# Patient Record
Sex: MALE | Race: BLACK OR AFRICAN AMERICAN | NOT HISPANIC OR LATINO
[De-identification: names, ages, dates, MRNs, and addresses within clinical notes are randomized per-mention and may not be internally consistent; named-entity substitution may affect disease eponyms.]

---

## 2018-12-16 ENCOUNTER — HOSPITAL (OUTPATIENT)
Dept: OTHER | Age: 23
End: 2018-12-16
Attending: EMERGENCY MEDICINE

## 2022-04-19 ENCOUNTER — OFFICE VISIT (OUTPATIENT)
Dept: ORTHOPEDIC SURGERY | Age: 27
End: 2022-04-19

## 2022-04-19 DIAGNOSIS — S86.111A GASTROCNEMIUS STRAIN, RIGHT, INITIAL ENCOUNTER: ICD-10-CM

## 2022-04-19 DIAGNOSIS — M79.661 RIGHT CALF PAIN: Primary | ICD-10-CM

## 2022-04-19 PROCEDURE — G8421 BMI NOT CALCULATED: HCPCS | Performed by: ORTHOPAEDIC SURGERY

## 2022-04-19 PROCEDURE — G8428 CUR MEDS NOT DOCUMENT: HCPCS | Performed by: ORTHOPAEDIC SURGERY

## 2022-04-19 PROCEDURE — 4004F PT TOBACCO SCREEN RCVD TLK: CPT | Performed by: ORTHOPAEDIC SURGERY

## 2022-04-19 PROCEDURE — 99204 OFFICE O/P NEW MOD 45 MIN: CPT | Performed by: ORTHOPAEDIC SURGERY

## 2022-04-19 PROCEDURE — L3170 FOOT PLAS HEEL STABI PRE OTS: HCPCS | Performed by: ORTHOPAEDIC SURGERY

## 2022-04-19 NOTE — PROGRESS NOTES
Date:  2022    Name:  Kayleigh Bird  Address:  [address] [address]    :  1995      Age:   27 y.o.    SSN:        Medical Record Number:  [id number]    Reason for Visit:    Chief Complaint    Leg Pain (NP Right calf pain/injury)      DOS:2022     HPI: Kayleigh Bird is a 27 y.o. male here today for evaluation of right calf pain that has been ongoing for 4 days. He is a former collegiate and overseas professional . He states he was playing recreational basketball on Friday, 4/15/2022 and injured his right calf. He states he felt a pop and has had trouble weightbearing since the injury. He complains of medial sided mid calf pain and swelling exacerbated with walking. Denies numbness and tingling. He has been using Advil and ice but has not had any formal treatment for this issue. Pain Assessment  Location of Pain: Leg  Location Modifiers: Right  Severity of Pain: 7  Quality of Pain: Sharp  Duration of Pain: Persistent  Frequency of Pain: Intermittent  Aggravating Factors: Walking  Limiting Behavior: Yes  Relieving Factors: Rest  Result of Injury: Yes  Work-Related Injury: No  Are there other pain locations you wish to document?: No  ROS: Review of systems reviewed from Patient History Form completed today and available in the patient's chart under the Media tab. No past medical history on file. No past surgical history on file. No family history on file.     Social History     Socioeconomic History    Marital status: Unknown     Spouse name: Not on file    Number of children: Not on file    Years of education: Not on file    Highest education level: Not on file   Occupational History    Not on file   Tobacco Use    Smoking status: Not on file    Smokeless tobacco: Not on file   Substance and Sexual Activity    Alcohol use: Not on file    Drug use: Not on file    Sexual activity: Not on file   Other Topics Concern    Not on file   Social History Narrative    Not on file     Social Determinants of Health     Financial Resource Strain:     Difficulty of Paying Living Expenses: Not on file   Food Insecurity:     Worried About Running Out of Food in the Last Year: Not on file    [place] of Food in the Last Year: Not on file   Transportation Needs:     Lack of Transportation (Medical): Not on file    Lack of Transportation (Non-Medical): Not on file   Physical Activity:     Days of Exercise per Week: Not on file    Minutes of Exercise per Session: Not on file   Stress:     Feeling of Stress : Not on file   Social Connections:     Frequency of Communication with Friends and Family: Not on file    Frequency of Social Gatherings with Friends and Family: Not on file    Attends Gnosticism Services: Not on file    Active Member of [address] [place] [address] [employer] or Organizations: Not on file    Attends Club or Organization Meetings: Not on file    Marital Status: Not on file   Intimate Partner Violence:     Fear of Current or Ex-Partner: Not on file    Emotionally Abused: Not on file    Physically Abused: Not on file    Sexually Abused: Not on file   Housing Stability:     Unable to Pay for Housing in the Last Year: Not on file    Number of [place] in the Last Year: Not on file    Unstable Housing in the Last Year: Not on file       No current outpatient medications on file. No current facility-administered medications for this visit. Not on File    Vital signs: There were no vitals taken for this visit. RIGHT Lower Extremity Exam:    There is normal alignment. Normal muscle tone and development. Full range of motion is present at the knee and ankle. No instability is present. Tenderness over medial gastroc. Swelling present over medial calf. The skin is warm and well-perfused. Sensation is intact to light touch. LEFT Lower Extremity Exam:    There is normal alignment. Normal muscle tone and development. Full range of motion is present at the knee and ankle. No instability is present. There is no focal tenderness. No swelling or ecchymosis is present. The skin is warm and well-perfused. Sensation is intact to light touch. Diagnostics:  Radiology:     Pertinent imaging was interpreted and reviewed with the patient. RIGHT tib/fib x-ray:    AP, lateral views were obtained  and reviewed of the right tib/fib. Impression: No acute bony abnormalities appreciated on radiographic examination. Assessment: 27year old male with medial gastrocnemius strain of right calf    Plan: Pertinent imaging was reviewed. The etiology, natural history, and treatment options for the disorder were discussed. The roles of activity medication, antiinflammatories, injections, bracing, physical therapy, and surgical interventions were all described to the patient and questions were answered. He suffered what is likely a grade 2 medial gastroc strain. Diagnosis and treatment options were discussed. He does play recreational basketball 4-6 times a week and we discussed this type of injury should take around 4-6 weeks to heal. I believe he is a candidate for formal, supervised physical therapy for a medial gastroc strain as well as a heel lift in his shoe to use for support with ambulation. He wishes to proceed. He can also continue his Advil but increase to 3 tablets, 3 times a day. The patient was advised that NSAID-type medications have several potential side effects that include: gastrointestinal irritation including hemorrhage, renal injury, as well as an increased risk for heart attack and stroke. The patient was asked to take the medication with food and to stop if there is any symptoms of GI upset, including heartburn, nausea, increased gas or diarrhea. I asked the patient to contact their medical provider for vomiting, abdominal pain or black/bloody stools.  The patient should have renal function testing per his medical provider periodically if the medication is taken on a regular basis. The patient should be alert for any swelling in the lower extremities and should stop taking the medication immediately and contact their medical provider should this occur. In addition, the patient should stop taking the medication immediately and contact their medical provider should there be any shortness of breath, fatigue and be evaluated in an emergency facility for any chest pain. The patient expresses understanding of these issues and questions were answered. I will see him back in 3-4 weeks, sooner if symptoms worsen. Chelsea Limon is in agreement with this plan. All questions were answered to patient's satisfaction and was encouraged to call with any further questions. Orders Placed This Encounter   Procedures    XR TIBIA FIBULA RIGHT (2 VIEWS)     Standing Status:   Future     Number of Occurrences:   1     Standing Expiration Date:   4/19/2023     Order Specific Question:   Reason for exam:     Answer:   pain    Ambulatory referral to Physical Therapy     Referral Priority:   Routine     Referral Type:   Eval and Treat     Referral Reason:   Specialty Services Required     Requested Specialty:   Physical Therapy     Number of Visits Requested:   1    Visco N Heel Shoe Inserts     Patient was prescribed a Visco N Heel Shoe Insert. The right calf  will require protection / support from this orthosis to improve their function. The orthosis will assist in protecting the affected area, provide functional support and facilitate healing. The patient was educated and fit by a healthcare professional with expert knowledge and specialization in brace application while under the direct supervision of the treating physician. Verbal and written instructions for the use of and application of this item were provided.    They were instructed to contact the office immediately should the brace result in increased pain, decreased sensation, increased swelling or worsening of the condition. Total time spent for evaluation, education and development of treatment plan: 48 minutes      I, Zoey Mendez ATC, am scribing for and in the presence of Dr. Mark Mcbride. 04/19/22 2:53 PM Zoey Mendez ATC    I attest that I met personally with the patient, performed the described exam, reviewed the radiographic studies and medical records associated with this patient and supervised the services that are described above.      Bhanu Mccain MD

## 2022-05-11 ENCOUNTER — OFFICE VISIT (OUTPATIENT)
Dept: ORTHOPEDIC SURGERY | Age: 27
End: 2022-05-11
Payer: COMMERCIAL

## 2022-05-11 VITALS — BODY MASS INDEX: 28.01 KG/M2 | WEIGHT: 230 LBS | HEIGHT: 76 IN

## 2022-05-11 DIAGNOSIS — S86.811D STRAIN OF CALF MUSCLE, RIGHT, SUBSEQUENT ENCOUNTER: Primary | ICD-10-CM

## 2022-05-11 PROCEDURE — 1036F TOBACCO NON-USER: CPT | Performed by: ORTHOPAEDIC SURGERY

## 2022-05-11 PROCEDURE — 99214 OFFICE O/P EST MOD 30 MIN: CPT | Performed by: ORTHOPAEDIC SURGERY

## 2022-05-11 PROCEDURE — G8419 CALC BMI OUT NRM PARAM NOF/U: HCPCS | Performed by: ORTHOPAEDIC SURGERY

## 2022-05-11 PROCEDURE — G8427 DOCREV CUR MEDS BY ELIG CLIN: HCPCS | Performed by: ORTHOPAEDIC SURGERY

## 2022-05-11 NOTE — PROGRESS NOTES
Chief Complaint  Follow-up (right calf. )      History of Present Illness:  Chandni Fitzpatrick is a pleasant 27 y.o. male here for follow-up regarding his right calf. We last saw him 1 month ago and he was diagnosed with a calf strain. 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 He is doing better today than last visit however he has some new achilles tendon tenderness. No reinjury or major setbacks. Medical History:  Patient's medications, allergies, past medical, surgical, social and family histories were reviewed and updated as appropriate. Pain Assessment  Location of Pain: Leg  Location Modifiers: Right  Quality of Pain: Aching,Sharp  Duration of Pain: A few minutes  Frequency of Pain: Intermittent  Aggravating Factors: Walking (running)  Limiting Behavior: Some  Relieving Factors: Rest  Result of Injury: Yes  Work-Related Injury: No  Are there other pain locations you wish to document?: No  ROS: Review of systems reviewed from Patient History Form completed today and available in the patient's chart under the Media tab. Pertinent items are noted in HPI  Review of systems reviewed from Patient History Form completed today and available in the patient's chart under the Media tab. Vital Signs:  Ht 6' 4\" (1.93 m)   Wt 230 lb (104.3 kg)   BMI 28.00 kg/m²     Right lower extremity examination:    Appearance: Normal alignment. No ecchymosis or induration noted. Palpation: pushpa over medial gastrocnemius muscle. No midline tenderness. Negative Homans sign. Range of motion: Full range of motion of the knee and ankle. Stability: No instability appreciated. Neurovascular: Normal sensation present. Skin warm well perfused. Additional findings: Mild gastroc weakness appreciated with toe rise.       Radiology:       Pertinent imaging was interpreted and reviewed with the patient today, images only - no report available. No new imaging was obtained during today's visit. Assessment :  26 yo male with grade 2 right calf strain    Impression:  Encounter Diagnosis   Name Primary?  Strain of calf muscle, right, subsequent encounter Yes       Office Procedures:  Orders Placed This Encounter   Procedures    Ambulatory referral to Physical Therapy     Referral Priority:   Routine     Referral Type:   Eval and Treat     Referral Reason:   Specialty Services Required     Number of Visits Requested:   1         Plan: Pertinent imaging was reviewed. The etiology, natural history, and treatment options for the disorder were discussed. The roles of activity medication, antiinflammatories, injections, bracing, physical therapy, and surgical interventions were all described to the patient and questions were answered. Patient has improved since last visit. He has not attended PT this that therefore I believe he is a candidate for formal supervised physical therapy working with a goal to return to recreational basketball 4 times a week. Follow-up in 1 month or sooner if worsening symptoms    Irma Blas is in agreement with this plan. All questions were answered to patient's satisfaction and was encouraged to call with any further questions. Total time spent for evaluation, education, and development of treatment plan: 35 minutes    Boogie Grider, [address] [facility]  5/11/2022    During this exam, IBoogie PA-C, functioned as a scribe for Dr. Awais Moore. The history taking and physical examination were performed by Dr. Awais Moore. All counseling during the appointment was performed between the patient and Dr. Awais Moore. 5/11/2022 3:20 PM    This dictation was performed with a verbal recognition program (DRAGON) and it was checked for errors. It is possible that there are still dictated errors within this office note.   If so, please bring any areas to my attention for an addendum. All efforts were made to ensure that this office note is accurate. I attest that I met personally with the patient, performed the described exam, reviewed the radiographic studies and medical records associated with this patient and supervised the services that are described above.      Herman Decker MD

## 2022-05-17 ENCOUNTER — HOSPITAL ENCOUNTER (OUTPATIENT)
Dept: PHYSICAL THERAPY | Age: 27
Setting detail: THERAPIES SERIES
Discharge: HOME | End: 2022-05-17
Payer: COMMERCIAL

## 2022-05-17 PROCEDURE — 97161 PT EVAL LOW COMPLEX 20 MIN: CPT

## 2022-05-17 PROCEDURE — 97112 NEUROMUSCULAR REEDUCATION: CPT

## 2022-05-17 PROCEDURE — 97110 THERAPEUTIC EXERCISES: CPT

## 2022-05-17 NOTE — PLAN OF CARE
The [address]  Phone [phone]  Fax [phone]      Physical Therapy Certification    Dear  Dr. Kayleigh James,    We had the pleasure of evaluating the following patient for physical therapy services at [address] [place] [address]. A summary of our findings can be found in the initial assessment below. This includes our plan of care. If you have any questions or concerns regarding these findings, please do not hesitate to contact me at the office phone number checked above. Thank you for the referral.       Physician Signature:_______________________________Date:__________________  By signing above (or electronic signature), therapists plan is approved by physician      Patient: Jyotsna Montes   : 1995   MRN: [id number]  Referring Physician:  Dr. Kayleigh James       Evaluation Date: 2022      Medical Diagnosis Information:  Diagnosis: Z86.457F (ICD-10-CM) - Strain of calf muscle, right, subsequent encounter   Treatment Diagnosis: Diagnosis: S86.811D (ICD-10-CM) - Strain of calf muscle, right, subsequent encounter                                         Insurance information: PT Insurance Information: [facility]     Precautions/ Contra-indications:       C-SSRS Triggered by Intake questionnaire (Past 2 wk assessment):   [x] No, Questionnaire did not trigger screening.   [] Yes, Patient intake triggered further evaluation      [] C-SSRS Screening completed  [] PCP notified via Plan of Care  [] Emergency services notified     Latex Allergy:  [x]NO      []YES  Preferred Language for Healthcare:   [x]English       []other:    SUBJECTIVE: Patient arrived to physical therapy with c/o achilles tendon tenderness. Pt originally experienced R calf strain on 4/15/22 while playing recreational basketball.  He reported feeling a pop and had difficulty weight bearing on R LE. Symptoms were originally located medial side of R mid calf  Pt saw MD on 4/19 and had x-ray which was negative. Pt was given heel inserts at first appointment. Pt had f/u with MD on 5/11 and calf pain improved but developed discomfort at achilles tendon. It was recommended pt attempt PT in order to assist with safe progression back to recreational basketball. Symptoms are not consistent and has some days without pain. Denies having swelling and no pain in calf    Relevant Medical History: Unremarkable for previous LE pathologies or health conditions.     Functional Disability Index: FOTO lower leg 69    Pain Scale: 4-5/10  Easing factors: Rest, ice, ibuporfen  Provocative factors: Walking     Type: []Constant   [x]Intermittent  []Radiating [x]Localized []other:     Numbness/Tingling: Denies     Occupation/School: Desk work     Living Status/Prior Level of Function: Independent with ADLs and IADLs, playing basketball 4x a week    OBJECTIVE:   ROM PROM AROM Comments    Left Right Left Right    Dorsiflexion   12 10    Plantarflexion   55 55    Inversion   31 30    Eversion   9 9                      Strength Left Right Comments   Dorsiflexion 5 5    Plantarflexion 5 5    Inversion 5 5    Eversion 5 5        Girth Left Right Comments   Transmalleolar 27 cm 27 cm         Reflexes/Sensation:    [x]Dermatomes/Myotomes intact    []Reflexes equal and normal bilaterally   []Other:    Joint mobility:    [x]Normal    []Hypo   []Hyper    Palpation: Mild muscle knot noted at R medial gastroc but no tenderness     Functional Mobility/Transfers:   SLS: Eyes open mild to moderate postural sway bilat, no Trendelenburg or pain  SL heel raise: WNL after 5 reps on L, mild hesitancy at irritation of activity that improved with rest  DL squat: WNL no pain   SL squat: 25% decrease in depth on R compared to L with knee pain on R reported     Posture: Mild calcaneal valgus, moderate forefoot pronation in stanidng    Bandages/Dressings/Incisions: N/A    Gait: Toe in L>R    Orthopedic Special Tests:   Hamstring flexibility 165 deg L, 160 deg R measured 90/90                        [x] Patient history, allergies, meds reviewed. Medical chart reviewed. See intake form. Review Of Systems (ROS):  [x]Performed Review of systems (Integumentary, CardioPulmonary, Neurological) by intake and observation. Intake form has been scanned into medical record. Patient has been instructed to contact their primary care physician regarding ROS issues if not already being addressed at this time.       Co-morbidities/Complexities (which will affect course of rehabilitation):   [x]None           Arthritic conditions   []Rheumatoid arthritis (M05.9)  []Osteoarthritis (M19.91)   Cardiovascular conditions   []Hypertension (I10)  []Hyperlipidemia (E78.5)  []Angina pectoris (I20)  []Atherosclerosis (I70)   Musculoskeletal conditions   []Disc pathology   []Congenital spine pathologies   []Prior surgical intervention  []Osteoporosis (M81.8)  []Osteopenia (M85.8)   Endocrine conditions   []Hypothyroid (E03.9)  []Hyperthyroid Gastrointestinal conditions   []Constipation (Z51.28)   Metabolic conditions   []Morbid obesity (E66.01)  []Diabetes type 1(E10.65) or 2 (E11.65)   []Neuropathy (G60.9)     Pulmonary conditions   []Asthma (J45)  []Coughing   []COPD (J44.9)   Psychological Disorders  []Anxiety (F41.9)  []Depression (F32.9)   []Other:   []Other:          Barriers to/and or personal factors that will affect rehab potential:              []Age  []Sex              [x]Motivation/Lack of Motivation                        []Co-Morbidities              []Cognitive Function, education/learning barriers              []Environmental, home barriers              []profession/work barriers  []past PT/medical experience  []other:  Justification: Desire to get back to basketball is a potential barrier to treatment    Falls Risk Assessment (30 days):   [x] Falls Risk assessed and no intervention required. [] Falls Risk assessed and Patient requires intervention due to being higher risk   TUG score (>12s at risk):     [] Falls education provided, including         ASSESSMENT:    Functional Impairments:     []Decreased LE joint mobility   []Decreased LE functional ROM   []Decreased core/proximal hip strength and neuromuscular control   [x]Decreased LE functional strength  []Reduced balance/proprioceptive control    [x]Foot biomechanics dysfunction requiring correction:   []other:    Functional Activity Limitations (from functional questionnaire and intake)   [x]Reduced ability to tolerate prolonged functional positions   []Reduced ability or difficulty with changes of positions or transfers between positions   []Reduced ability to maintain good posture and demonstrate good body mechanics with sitting, bending, and lifting   []Reduced ability to sleep   [] Reduced ability or tolerance with driving    [x]Reduced ability to squat  Participation Restrictions   []Reduced participation in self care activities   []Reduced participation in home management activities   [x]Reduced participation in work activities   [x]Reduced participation in social activities. [x]Reduced participation in sport activities. Classification :    []Signs/symptoms consistent with post-surgical status including decreased ROM, strength and function.    [x]Signs/symptoms consistent with muscle sprain/strain   []Signs/symptoms consistent with patella-femoral syndrome   []Signs/symptoms consistent with knee OA/hip OA   []Signs/symptoms consistent with internal derangement of knee/Hip/ankle   []Signs/symptoms consistent with functional hip/knee/ankle-foot weakness/NMR control      []Signs/symptoms consistent with tendinitis/tendinosis    []signs/symptoms consistent with pathology which may benefit from Dry needling      []other:      Prognosis/Rehab Potential:      [] Excellent   [x]Good    []Fair   []Poor    Tolerance of evaluation/treatment:    []Excellent   [x]Good    []Fair   []Poor    Physical Therapy Evaluation Complexity Justification  [x] A history of present problem with:  [] no personal factors and/or comorbidities that impact the plan of care;  []1-2 personal factors and/or comorbidities that impact the plan of care  []3 personal factors and/or comorbidities that impact the plan of care  [x] An examination of body systems using standardized tests and measures addressing any of the following: body structures and functions (impairments), activity limitations, and/or participation restrictions;:  [x] a total of 1-2 or more elements   [] a total of 3 or more elements   [] a total of 4 or more elements   [x] A clinical presentation with:  [x] stable and/or uncomplicated characteristics   [] evolving clinical presentation with changing characteristics  [] unstable and unpredictable characteristics;   [x] Clinical decision making of [x] low, [] moderate, [] high complexity using standardized patient assessment instrument and/or measurable assessment of functional outcome. [x] EVAL (LOW) 47872 (typically 20 minutes face-to-face)  [] EVAL (MOD) 20391 (typically 30 minutes face-to-face)  [] EVAL (HIGH) 93278 (typically 45 minutes face-to-face)  [] RE-EVAL       PLAN  Frequency/Duration:  1 days per week for 4 Weeks:  Interventions:  [x]  Therapeutic exercise including: strength training, ROM, for Lower extremity and core   [x]  NMR activation and proprioception for LE, Glutes and Core   [x]  Manual therapy as indicated for LE, Hip and spine to include: Dry Needling/IASTM, STM, PROM, Gr I-IV mobilizations, manipulation. [x] Modalities as needed that may include: thermal agents, E-stim, Biofeedback, US, iontophoresis as indicated  [x] Patient education on joint protection, postural re-education, activity modification, progression of HEP.     HEP instruction:   Access Code: [facility]  URL: [email or web address]  Date: 05/17/2022  Prepared by: Anyi Molina    Exercises  Supine Hamstring Stretch with Strap - 1-3 x daily - 7 x weekly - 1 sets - 3 reps - 30 hold  Standing Gastroc Stretch - 1-3 x daily - 7 x weekly - 1 sets - 3 reps - 30 hold  Long Sitting Eccentric Ankle Plantar Flexion with Resistance - 1-3 x daily - 7 x weekly - 3 sets - 10 reps  Arch Lifting - 1-3 x daily - 7 x weekly - 1 sets - 10 reps - 5 hold  Eccentric Heel Lowering on Step - 1-3 x daily - 7 x weekly - 3 sets - 10 reps      GOALS:   Patient stated goal: Return to playing basketball     [] Progressing: [] Met: [] Not Met: [] Adjusted    Therapist goals for Patient:   Short Term Goals: To be achieved in: 2 weeks  1. Independent in HEP and progression per patient tolerance, in order to prevent re-injury. [] Progressing: [] Met: [] Not Met: [] Adjusted   2. Patient will have a decrease in pain to facilitate improvement in movement, function, and ADLs as indicated by Functional Deficits. [] Progressing: [] Met: [] Not Met: [] Adjusted    Long Term Goals: To be achieved in: 4 weeks  1. Patient will score 87 on FOTO Lower leg assessment indicating patient ability to return to athletic abiliity. [] Progressing: [] Met: [] Not Met: [] Adjusted  2. Patient will demonstrate increased R LE hamstring flexibility that is equal to L to all for proper functional movement in weight bearing such as squatting without increase in ankle symptoms. [] Progressing: [] Met: [] Not Met: [] Adjusted  3. Patient will demonstrate an increase in eccentric strength in order to complete SL squat on R with normal depth and no compensatory movement in order to begin transition back to sport. [] Progressing: [] Met: [] Not Met: [] Adjusted  4. Patient will have increase in strength and flexibility in order to ambulate over 30 minutes with no c/o pain allowing for pt to begin return to running progression.    [] Progressing: [] Met: []

## 2022-05-17 NOTE — FLOWSHEET NOTE
The [address] [place] [address] 200, [address] [place] [address], [address] [place] [address]  Phone: [phone]   Fax:     [phone]    Physical Therapy Daily Treatment Note  Date:  2022    Patient Name:  Lulú Zarco    :  1995  MRN: [id number]  Restrictions/Precautions:    Medical/Treatment Diagnosis Information:  · Diagnosis: K24.661C (ICD-10-CM) - Strain of calf muscle, right, subsequent encounter  · Treatment Diagnosis: Diagnosis: S86.811D (ICD-10-CM) - Strain of calf muscle, right, subsequent encounter  Insurance/Certification information:  PT Insurance Information: [facility]  Physician Information:   Katharina Lara  Has the plan of care been signed (Y/N):        []  Yes  [x]  No     Date of Patient follow up with Physician:       Is this a Progress Report:     []  Yes  [x]  No        If Yes:  Date Range for reporting period:  Beginning   Ending    Progress report will be due (10 Rx or 30 days whichever is less):        Recertification will be due (POC Duration  / 90 days whichever is less):          Visit # Insurance Allowable Auth Required   1 [facility]  60 visits []  Yes [x]  No        Functional Scale: FOTO Lower leg 69    Date assessed:         Latex Allergy:  [x]NO      []YES  Preferred Language for Healthcare:   [x]English       []other:      Pain level:  4-5/10     SUBJECTIVE:  See eval    OBJECTIVE: See eval   Observation:    Test measurements:      RESTRICTIONS/PRECAUTIONS: Calf strain on R 4/15    Exercises/Interventions:     Exercise/Equipment Resistance/Repetitions Other comments   ROM/ stretching     Hamstring stretch 30\"hx3 R  supine    Calf stretch 30\"hx3 R  standing                                            Isometrics     Arch lifts 5\"hx10 R  standing   Plantarflexion     Inversion     Eversion          PRE's     Dorsiflexion     Plantarflexion Black 3x10 R  eccentric return    Inversion     Eversion     Heel walk Toe walk     SLR     Calf Raises @ step with eccentric lower 3x10 R 5/17 8\" step    Step Up     Knee Extension     Hamstring Curls     Leg Press          Balance:     Rocker Board     BOSU     SLS     Aeromat     Foam Roll     Plyoback     Tandem Stance     Biodex          Bike     Treadmill          Manual interventions              Therapeutic Exercise and NMR EXR  [x] (08430) Provided verbal/tactile cueing for activities related to strengthening, flexibility, endurance, ROM for improvements in LE, proximal hip, and core control with self care, mobility, lifting, ambulation.  [] (72427) Provided verbal/tactile cueing for activities related to improving balance, coordination, kinesthetic sense, posture, motor skill, proprioception  to assist with LE, proximal hip, and core control in self care, mobility, lifting, ambulation and eccentric single leg control.      NMR and Therapeutic Activities:    [] (97385 or 31916) Provided verbal/tactile cueing for activities related to improving balance, coordination, kinesthetic sense, posture, motor skill, proprioception and motor activation to allow for proper function of core, proximal hip and LE with self care and ADLs  [] (32801) Gait Re-education- Provided training and instruction to the patient for proper LE, core and proximal hip recruitment and positioning and eccentric body weight control with ambulation re-education including up and down stairs     Home Exercise Program:    [x] (62599) Reviewed/Progressed HEP activities related to strengthening, flexibility, endurance, ROM of core, proximal hip and LE for functional self-care, mobility, lifting and ambulation/stair navigation   [] (11891)Reviewed/Progressed HEP activities related to improving balance, coordination, kinesthetic sense, posture, motor skill, proprioception of core, proximal hip and LE for self care, mobility, lifting, and ambulation/stair navigation      Manual Treatments:  PROM / STM / Oscillations-Mobs:  G-I, II, III, IV (PA's, Inf., Post.)  [] (75915) Provided manual therapy to mobilize LE, proximal hip and/or LS spine soft tissue/joints for the purpose of modulating pain, promoting relaxation,  increasing ROM, reducing/eliminating soft tissue swelling/inflammation/restriction, improving soft tissue extensibility and allowing for proper ROM for normal function with self care, mobility, lifting and ambulation. Modalities:      Charges:  Timed Code Treatment Minutes: 24'+eval   Total Treatment Minutes: 4:15-5:05  50'     [x] EVAL (LOW) 56855 (typically 20 minutes face-to-face)  [] EVAL (MOD) 15434 (typically 30 minutes face-to-face)  [] EVAL (HIGH) 68610 (typically 45 minutes face-to-face)  [] RE-EVAL     [x] YX(05820) x  1   [] IONTO  [x] NMR (32294) x 1    [] VASO  [] Manual (75448) x      [] Other:  [] TA x      [] Mech Traction (78524)  [] ES(attended) (59759)      [] ES (un) (77916):     GOALS:   Patient stated goal: Return to playing basketball     []? Progressing: []? Met: []? Not Met: []? Adjusted     Therapist goals for Patient:   Short Term Goals: To be achieved in: 2 weeks  1. Independent in HEP and progression per patient tolerance, in order to prevent re-injury. []? Progressing: []? Met: []? Not Met: []? Adjusted   2. Patient will have a decrease in pain to facilitate improvement in movement, function, and ADLs as indicated by Functional Deficits. []? Progressing: []? Met: []? Not Met: []? Adjusted     Long Term Goals: To be achieved in: 4 weeks  1. Patient will score 87 on FOTO Lower leg assessment indicating patient ability to return to athletic abiliity.   []? Progressing: []? Met: []? Not Met: []? Adjusted  2. Patient will demonstrate increased R LE hamstring flexibility that is equal to L to all for proper functional movement in weight bearing such as squatting without increase in ankle symptoms. []? Progressing: []? Met: []? Not Met: []? Adjusted  3.  Patient will demonstrate an increase in eccentric strength in order to complete SL squat on R with normal depth and no compensatory movement in order to begin transition back to sport. []? Progressing: []? Met: []? Not Met: []? Adjusted  4. Patient will have increase in strength and flexibility in order to ambulate over 30 minutes with no c/o pain allowing for pt to begin return to running progression. []? Progressing: []? Met: []? Not Met: []? Adjusted         Overall Progression Towards Functional goals/ Treatment Progress Update:  [] Patient is progressing as expected towards functional goals listed. [] Progression is slowed due to complexities/Impairments listed. [] Progression has been slowed due to co-morbidities. [x] Plan just implemented, too soon to assess goals progression <30days   [] Goals require adjustment due to lack of progress  [] Patient is not progressing as expected and requires additional follow up with physician  [] Other    Prognosis for POC: [x] Good [] Fair  [] Poor      Patient requires continued skilled intervention: [x] Yes  [] No    Treatment/Activity Tolerance:  [x] Patient able to complete treatment  [] Patient limited by fatigue  [] Patient limited by pain     [] Patient limited by other medical complications  [] Other:     Patient Education:              5/17 Educated on proper foot mechanics, proper muscle activation and importance of eccentirc strengthening to improve functional movement and assist with return to sport    HEP instruction:   Access Code: [place]  URL: [email or web address]  Date: 05/17/2022  Prepared by: Jaime Fermin     Exercises  Supine Hamstring Stretch with Strap - 1-3 x daily - 7 x weekly - 1 sets - 3 reps - 30 hold  Standing Gastroc Stretch - 1-3 x daily - 7 x weekly - 1 sets - 3 reps - 30 hold  Long Sitting Eccentric Ankle Plantar Flexion with Resistance - 1-3 x daily - 7 x weekly - 3 sets - 10 reps  Arch Lifting - 1-3 x daily - 7 x weekly - 1 sets - 10 reps - 5 hold  Eccentric Heel Lowering on Step - 1-3 x daily - 7 x weekly - 3 sets - 10 reps        PLAN: See eval  [] Continue per plan of care [] Alter current plan (see comments above)  [x] Plan of care initiated [] Hold pending MD visit [] Discharge      Electronically signed by:  David Rowland PT    Note: If patient does not return for scheduled/ recommended follow up visits, this note will serve as a discharge from care along with most recent update on progress.

## 2022-05-24 ENCOUNTER — HOSPITAL ENCOUNTER (OUTPATIENT)
Dept: PHYSICAL THERAPY | Age: 27
Setting detail: THERAPIES SERIES
Discharge: HOME | End: 2022-05-24
Payer: COMMERCIAL

## 2022-05-24 PROCEDURE — 97140 MANUAL THERAPY 1/> REGIONS: CPT

## 2022-05-24 PROCEDURE — 97112 NEUROMUSCULAR REEDUCATION: CPT

## 2022-05-24 PROCEDURE — 97110 THERAPEUTIC EXERCISES: CPT

## 2022-05-24 NOTE — FLOWSHEET NOTE
[facility], [address] [employer] [facility] [address], [address] [place] [address]  Phone: [phone]   Fax:     [phone]    Physical Therapy Daily Treatment Note  Date:  2022    Patient Name:  Irma Blas    :  1995  MRN: [id number]  Restrictions/Precautions:    Medical/Treatment Diagnosis Information:  · Diagnosis: J67.254L (ICD-10-CM) - Strain of calf muscle, right, subsequent encounter  · Treatment Diagnosis: Diagnosis: B33.552N (ICD-10-CM) - Strain of calf muscle, right, subsequent encounter  Insurance/Certification information:  PT Insurance Information: [facility]  Physician Information:   Carlito Ferrer  Has the plan of care been signed (Y/N):        [x]  Yes  []  No     Date of Patient follow up with Physician:       Is this a Progress Report:     []  Yes  [x]  No        If Yes:  Date Range for reporting period:  Beginning   Ending    Progress report will be due (10 Rx or 30 days whichever is less):        Recertification will be due (POC Duration  / 90 days whichever is less):          Visit # Insurance Allowable Auth Required   2   [facility]  60 visits []  Yes [x]  No        Functional Scale: FOTO Lower leg 69    Date assessed:         Latex Allergy:  [x]NO      []YES  Preferred Language for Healthcare:   [x]English       []other:      Pain level:  0/10     SUBJECTIVE:   Pt states the last couple days are some of the best he has had since injury.  HEP going well     OBJECTIVE: See eval   Observation:    Test measurements:      RESTRICTIONS/PRECAUTIONS: Calf strain on R 4/15    Exercises/Interventions:     Exercise/Equipment Resistance/Repetitions Other comments   ROM/ stretching     Hamstring stretch 30\"hx3 R  supine    Calf stretch 30\"hx3 bilat ^ slant board   Biodex AROM DF/PF x3' R  Inv/evr x3' R Start  Postural stability   Pro stretch 10\"hx10 R Start     Fitter board CW/CCW x30 ea R Start  Isometrics     Arch lifts 10\"hx10 R ^5/24 standing    Plantarflexion     Inversion     Eversion          PRE's     Dorsiflexion     Plantarflexion Gray 3x10 R ^5/24 eccentric return    Inversion     Eversion     Heel walk     Toe walk     SL heel raise 3x10 R Start 5/24    Calf Raises @ step with eccentric lower 10\"hx10 R ^5/24 8\" step    SL RDL 3x10 R Start 5/24   Knee Extension     Hamstring Curls     Leg Press          Balance:     Rocker Board     BOSU     SLS     Aeromat     Foam Roll     Plyoback     Tandem Stance     Biodex          Bike     Treadmill          Manual interventions     IASTM  R medial gastroc x8' Start 5/24        Therapeutic Exercise and NMR EXR  [x] (64779) Provided verbal/tactile cueing for activities related to strengthening, flexibility, endurance, ROM for improvements in LE, proximal hip, and core control with self care, mobility, lifting, ambulation.  [] (41715) Provided verbal/tactile cueing for activities related to improving balance, coordination, kinesthetic sense, posture, motor skill, proprioception  to assist with LE, proximal hip, and core control in self care, mobility, lifting, ambulation and eccentric single leg control.      NMR and Therapeutic Activities:    [] (69571 or 91407) Provided verbal/tactile cueing for activities related to improving balance, coordination, kinesthetic sense, posture, motor skill, proprioception and motor activation to allow for proper function of core, proximal hip and LE with self care and ADLs  [] (03657) Gait Re-education- Provided training and instruction to the patient for proper LE, core and proximal hip recruitment and positioning and eccentric body weight control with ambulation re-education including up and down stairs     Home Exercise Program:    [x] (74595) Reviewed/Progressed HEP activities related to strengthening, flexibility, endurance, ROM of core, proximal hip and LE for functional self-care, mobility, lifting and ambulation/stair navigation   [] (37622)Reviewed/Progressed HEP activities related to improving balance, coordination, kinesthetic sense, posture, motor skill, proprioception of core, proximal hip and LE for self care, mobility, lifting, and ambulation/stair navigation      Manual Treatments:  PROM / STM / Oscillations-Mobs:  G-I, II, III, IV (PA's, Inf., Post.)  [x] (28380) Provided manual therapy to mobilize LE, proximal hip and/or LS spine soft tissue/joints for the purpose of modulating pain, promoting relaxation,  increasing ROM, reducing/eliminating soft tissue swelling/inflammation/restriction, improving soft tissue extensibility and allowing for proper ROM for normal function with self care, mobility, lifting and ambulation. Modalities:      Charges:  Timed Code Treatment Minutes: 45   Total Treatment Minutes: 4:16-5:03  47'     [] EVAL (LOW) 91765 (typically 20 minutes face-to-face)  [] EVAL (MOD) 98924 (typically 30 minutes face-to-face)  [] EVAL (HIGH) 62597 (typically 45 minutes face-to-face)  [] RE-EVAL     [x] TE(89825) x  1   [] IONTO  [x] NMR (28827) x 1    [] VASO  [x] Manual (47607) x 1     [] Other:  [] TA x      [] Mech Traction (77810)  [] ES(attended) (61688)      [] ES (un) (09241):     GOALS:   Patient stated goal: Return to playing basketball     []? Progressing: []? Met: []? Not Met: []? Adjusted     Therapist goals for Patient:   Short Term Goals: To be achieved in: 2 weeks  1. Independent in HEP and progression per patient tolerance, in order to prevent re-injury. []? Progressing: []? Met: []? Not Met: []? Adjusted   2. Patient will have a decrease in pain to facilitate improvement in movement, function, and ADLs as indicated by Functional Deficits. []? Progressing: []? Met: []? Not Met: []? Adjusted     Long Term Goals: To be achieved in: 4 weeks  1. Patient will score 87 on FOTO Lower leg assessment indicating patient ability to return to athletic abiliity.    []? Progressing: []? Met: []? Not Met: []? Adjusted  2. Patient will demonstrate increased R LE hamstring flexibility that is equal to L to all for proper functional movement in weight bearing such as squatting without increase in ankle symptoms. []? Progressing: []? Met: []? Not Met: []? Adjusted  3. Patient will demonstrate an increase in eccentric strength in order to complete SL squat on R with normal depth and no compensatory movement in order to begin transition back to sport. []? Progressing: []? Met: []? Not Met: []? Adjusted  4. Patient will have increase in strength and flexibility in order to ambulate over 30 minutes with no c/o pain allowing for pt to begin return to running progression. []? Progressing: []? Met: []? Not Met: []? Adjusted         Overall Progression Towards Functional goals/ Treatment Progress Update:  [] Patient is progressing as expected towards functional goals listed. [] Progression is slowed due to complexities/Impairments listed. [] Progression has been slowed due to co-morbidities. [x] Plan just implemented, too soon to assess goals progression <30days   [] Goals require adjustment due to lack of progress  [] Patient is not progressing as expected and requires additional follow up with physician  [] Other    Prognosis for POC: [x] Good [] Fair  [] Poor      Patient requires continued skilled intervention: [x] Yes  [] No    Treatment/Activity Tolerance:  [x] Patient able to complete treatment  [] Patient limited by fatigue  [] Patient limited by pain     [] Patient limited by other medical complications  [] Other: 5/24 Mild tension tenderness R medial gastroc that responded well to IASTM. Pt was challenged and fatigued with increase in intensity of treatment but no adverse effects noted or reported.      Patient Education:              5/24 Updated HEP   5/17 Educated on proper foot mechanics, proper muscle activation and importance of eccentirc strengthening to improve functional movement and assist with return to sport    HEP instruction:   Access Code: [facility]         PLAN: See eval  [x] Continue per plan of care [] Alter current plan (see comments above)  [x] Plan of care initiated [] Hold pending MD visit [] Discharge  5/24 Progress with agility drills and jumping based on pt symptoms to determine safe progression back to basketball. Electronically signed by:  Escobar Mckeon PT    Note: If patient does not return for scheduled/ recommended follow up visits, this note will serve as a discharge from care along with most recent update on progress.

## 2022-06-02 ENCOUNTER — HOSPITAL ENCOUNTER (OUTPATIENT)
Dept: PHYSICAL THERAPY | Age: 27
Setting detail: THERAPIES SERIES
Discharge: HOME | End: 2022-06-02
Payer: COMMERCIAL

## 2022-06-02 NOTE — FLOWSHEET NOTE
Physical Therapy  Cancellation/No-show Note  Patient Name:  Jyotsna Montes  :  1995   Date:  2022  Cancelled visits to date: 1  No-shows to date: 0    For today's appointment patient:  [x]  Cancelled  []  Rescheduled appointment  []  No-show     Reason given by patient:  []  Patient ill  []  Conflicting appointment  []  No transportation    []  Conflict with work  [x]  No reason given  []  Other:     Comments:      Electronically signed by:  Michaela Bradley PT, PT

## 2022-06-08 ENCOUNTER — HOSPITAL ENCOUNTER (OUTPATIENT)
Dept: PHYSICAL THERAPY | Age: 27
Setting detail: THERAPIES SERIES
Discharge: HOME | End: 2022-06-08
Payer: COMMERCIAL

## 2022-06-08 PROCEDURE — 97530 THERAPEUTIC ACTIVITIES: CPT

## 2022-06-08 PROCEDURE — 97112 NEUROMUSCULAR REEDUCATION: CPT

## 2022-06-08 PROCEDURE — 97110 THERAPEUTIC EXERCISES: CPT

## 2022-06-08 NOTE — FLOWSHEET NOTE
The [address] [place] [address],[address], [address] [address] [facility], [address] [place] [address]  Phone: [phone]   Fax:     [phone]    Physical Therapy Daily Treatment Note  Date:  2022    Patient Name:  Jay Benson    :  1995  MRN: [id number]  Restrictions/Precautions:    Medical/Treatment Diagnosis Information:  · Diagnosis: S33.728G (ICD-10-CM) - Strain of calf muscle, right, subsequent encounter  · Treatment Diagnosis: Diagnosis: P51.025C (ICD-10-CM) - Strain of calf muscle, right, subsequent encounter  Insurance/Certification information:  PT Insurance Information: [facility]  Physician Information:   Gamaliel Dumont  Has the plan of care been signed (Y/N):        [x]  Yes  []  No     Date of Patient follow up with Physician:       Is this a Progress Report:     []  Yes  [x]  No        If Yes:  Date Range for reporting period:  Beginning   Ending    Progress report will be due (10 Rx or 30 days whichever is less):        Recertification will be due (POC Duration  / 90 days whichever is less):          Visit # Insurance Allowable Auth Required   3    [facility]  60 visits []  Yes [x]  No        Functional Scale: FOTO Lower leg 69    Date assessed:         Latex Allergy:  [x]NO      []YES  Preferred Language for Healthcare:   [x]English       []other:      Pain level:  0/10     SUBJECTIVE:   Pt sates he has had muscle soreness in R calf after completing more activity such as calf raises. Has attempted to run and had soreness with increase in intensity but no pain. Has played some half melissa basketball with soreness but again nothing sharp.      OBJECTIVE: See eval   Observation:    Test measurements:      RESTRICTIONS/PRECAUTIONS: Calf strain on R 4/15    Exercises/Interventions:     Exercise/Equipment Resistance/Repetitions Other comments   ROM/ stretching     Hamstring stretch  supine    Calf stretch 30\"hx3 bilat ^ slant board   Metrilus AROM Start 5/24 Postural stability   Pro stretch Start 5/24    Fitter board Start 5/24   Dynamic warm up Heel/toe walk, knee to chest, superman, high kicks, lateral lunge, forward lunge with rot, skipping Start 6/8 1 lap ea                       Isometrics     Arch lifts ^5/24 standing    Plantarflexion     Inversion     Eversion          PRE's     Dorsiflexion     Plantarflexion ^5/24 eccentric return    Inversion     Eversion     Heel walk     Toe walk     SL heel raise 3x10 R Start 5/24 6/8 attempted to add weight but withheld d/t unilateral UE support required for balanc   Calf Raises @ step with eccentric lower 10\"hx10 R ^5/24 8\" step    SL RDL 3x10 R Start 5/24 6/8 opposite hand touching ball on floor, no UE support on table    LTD  3x10 R Start 6/8 4\" step, cues for proper  Form    Hamstring Curls     Leg Press          Balance:     Priccut Board     BOSU     SLS with hip abd and flexion SLS on R with L hip abd x30 and L hip flex x30 Start 6/8    Aeromat     Foam Roll     Plyoback     Tandem Stance     Biodex          Agility ladder Forward, lateral, ickey, out/in lat, hopscotch Start 6/8 3 laps    Jump squats  3x10 bilat Start 6/8         Manual interventions     IASTM  Withheld d/t no tenderness or tightness noted Start 5/24        Therapeutic Exercise and NMR EXR  [x] (39440) Provided verbal/tactile cueing for activities related to strengthening, flexibility, endurance, ROM for improvements in LE, proximal hip, and core control with self care, mobility, lifting, ambulation.  [] (22254) Provided verbal/tactile cueing for activities related to improving balance, coordination, kinesthetic sense, posture, motor skill, proprioception  to assist with LE, proximal hip, and core control in self care, mobility, lifting, ambulation and eccentric single leg control.      NMR and Therapeutic Activities:    [x] (52945 or 28617) Provided verbal/tactile cueing for activities related to improving balance, coordination, kinesthetic sense, posture, motor skill, proprioception and motor activation to allow for proper function of core, proximal hip and LE with self care and ADLs  [] (32603) Gait Re-education- Provided training and instruction to the patient for proper LE, core and proximal hip recruitment and positioning and eccentric body weight control with ambulation re-education including up and down stairs     Home Exercise Program:    [x] (50449) Reviewed/Progressed HEP activities related to strengthening, flexibility, endurance, ROM of core, proximal hip and LE for functional self-care, mobility, lifting and ambulation/stair navigation   [] (64659)Reviewed/Progressed HEP activities related to improving balance, coordination, kinesthetic sense, posture, motor skill, proprioception of core, proximal hip and LE for self care, mobility, lifting, and ambulation/stair navigation      Manual Treatments:  PROM / STM / Oscillations-Mobs:  G-I, II, III, IV (PA's, Inf., Post.)  [] (00634) Provided manual therapy to mobilize LE, proximal hip and/or LS spine soft tissue/joints for the purpose of modulating pain, promoting relaxation,  increasing ROM, reducing/eliminating soft tissue swelling/inflammation/restriction, improving soft tissue extensibility and allowing for proper ROM for normal function with self care, mobility, lifting and ambulation. Modalities:  CP R calf x15' 6/8     Charges:  Timed Code Treatment Minutes: 47'   Total Treatment Minutes: 10:00-11:09  69'     [] EVAL (LOW) 69202 (typically 20 minutes face-to-face)  [] EVAL (MOD) 44911 (typically 30 minutes face-to-face)  [] EVAL (HIGH) 76969 (typically 45 minutes face-to-face)  [] RE-EVAL     [x] CD(58963) x  1   [] IONTO  [x] NMR (24111) x 1    [] VASO  [] Manual (87232) x      [] Other:  [x] TA x 2     [] Mech Traction (54649)  [] ES(attended) (06244)      [] ES (un) (68117):     GOALS:   Patient stated goal: Return to playing basketball     []? Progressing: []?  Met: []? Not Met: []? Adjusted     Therapist goals for Patient:   Short Term Goals: To be achieved in: 2 weeks  1. Independent in HEP and progression per patient tolerance, in order to prevent re-injury. []? Progressing: []? Met: []? Not Met: []? Adjusted   2. Patient will have a decrease in pain to facilitate improvement in movement, function, and ADLs as indicated by Functional Deficits. []? Progressing: []? Met: []? Not Met: []? Adjusted     Long Term Goals: To be achieved in: 4 weeks  1. Patient will score 87 on FOTO Lower leg assessment indicating patient ability to return to athletic abiliity.   []? Progressing: []? Met: []? Not Met: []? Adjusted  2. Patient will demonstrate increased R LE hamstring flexibility that is equal to L to all for proper functional movement in weight bearing such as squatting without increase in ankle symptoms. []? Progressing: []? Met: []? Not Met: []? Adjusted  3. Patient will demonstrate an increase in eccentric strength in order to complete SL squat on R with normal depth and no compensatory movement in order to begin transition back to sport. []? Progressing: []? Met: []? Not Met: []? Adjusted  4. Patient will have increase in strength and flexibility in order to ambulate over 30 minutes with no c/o pain allowing for pt to begin return to running progression. []? Progressing: []? Met: []? Not Met: []? Adjusted         Overall Progression Towards Functional goals/ Treatment Progress Update:  [] Patient is progressing as expected towards functional goals listed. [] Progression is slowed due to complexities/Impairments listed. [] Progression has been slowed due to co-morbidities.   [x] Plan just implemented, too soon to assess goals progression <30days   [] Goals require adjustment due to lack of progress  [] Patient is not progressing as expected and requires additional follow up with physician  [] Other    Prognosis for POC: [x] Good [] Fair  [] Poor      Patient requires continued skilled intervention: [x] Yes  [] No    Treatment/Activity Tolerance:  [x] Patient able to complete treatment  [] Patient limited by fatigue  [] Patient limited by pain     [] Patient limited by other medical complications  [] Other: 6/8 Pt responded well to increase in intensity of treatment including agility ladders and jump squats. Cues required for proper form with jump squats in order to not land on his toes. Form improved with significant time spent providing verbal and tactile cues. Fatigued with increase in intensity of treatment but no adverse effects noted or reported. Patient Education:              6/8 Updated HEP and reviewed proper progression back to basketball including participating in only half court for 3 attempts before transitioning to full court. Educated on difference between pain and muscle soreness/fatigue. 5/24 Updated HEP   5/17 Educated on proper foot mechanics, proper muscle activation and importance of eccentirc strengthening to improve functional movement and assist with return to sport    HEP instruction:   Access Code: [facility]         PLAN: See eval  [x] Continue per plan of care [] Alter current plan (see comments above)  [x] Plan of care initiated [] Hold pending MD visit [] Discharge  6/8 Pt to continue with HEP and progress back to basketball in half court setting. Pt will f/u next week and complete progress note for potential discharge. Electronically signed by:  Nicole Espitia PT    Note: If patient does not return for scheduled/ recommended follow up visits, this note will serve as a discharge from care along with most recent update on progress.

## 2022-06-17 ENCOUNTER — HOSPITAL ENCOUNTER (OUTPATIENT)
Dept: PHYSICAL THERAPY | Age: 27
Setting detail: THERAPIES SERIES
Discharge: HOME | End: 2022-06-17
Payer: COMMERCIAL

## 2022-06-17 PROCEDURE — 97530 THERAPEUTIC ACTIVITIES: CPT

## 2022-06-17 PROCEDURE — 97110 THERAPEUTIC EXERCISES: CPT

## 2022-06-17 NOTE — DISCHARGE SUMMARY
The [facility], [address] [place] [address] Drive [address], [address]  Phone: [phone]   Fax:     [phone]   Physical Therapy Discharge Summary    Dear  Dr. Danniel Claude,    We had the pleasure of treating the following patient for physical therapy services at [address] [place] [address]. A summary of our findings can be found in the discharge summary below. If you have any questions or concerns regarding these findings, please do not hesitate to contact me at the office phone number checked above. Thank you for the referral.     Physician Signature:________________________________Date:__________________  By signing above (or electronic signature), therapists plan is approved by physician      Overall Response to Treatment:   [x]Patient is responding well to treatment and improvement is noted with regards to increase in flexibility, strength and functional strength allowing pt to return to basketball without pain. Based on progress he is appropriate for discharge.     []Patient should continue to improve in reasonable time if they continue HEP   []Patient has plateaued and is no longer responding to skilled PT intervention    []Patient is getting worse and would benefit from return to referring MD   []Patient unable to adhere to initial POC   []Other:     Physical Therapy Daily Treatment Note  Date:  2022    Patient Name:  Angella Keenan    :  1995  MRN: [id number]  Restrictions/Precautions:    Medical/Treatment Diagnosis Information:  · Diagnosis: H06.806E (ICD-10-CM) - Strain of calf muscle, right, subsequent encounter  · Treatment Diagnosis: Diagnosis: S86.811D (ICD-10-CM) - Strain of calf muscle, right, subsequent encounter  Insurance/Certification information:  PT Insurance Information: [facility]  Physician Information:   Kian Diaz  Has the plan of care been signed (Y/N):        [x]  Yes  []  No     Date of Patient follow up with Physician:       Is this a Progress Report:     [x]  Yes  [x]  No        If Yes:  Date Range for reporting period:  Beginning 5/17  Ending 6/17    Progress report will be due (10 Rx or 30 days whichever is less):       Recertification will be due (POC Duration  / 90 days whichever is less):         Visit # Insurance Allowable Auth Required   4  6/17  [facility]  60 visits []  Yes [x]  No        Functional Scale:   FOTO lower leg 85    Date assessed: 6/17  FOTO Lower leg 69    Date assessed:  5/17       Latex Allergy:  [x]NO      []YES  Preferred Language for Healthcare:   [x]English       []other:      Pain level:  0/10 6/17    SUBJECTIVE:  6/17 Pt states he is doing well with no pain with ADL's. Played in 4 basketball games today for 45 minutes with mild muscle soreness but no c/o pain. OBJECTIVE: Updated below on 6/17    Observation:  Functional Mobility/Transfers:    SLS: Eyes open WNL bilat   SL heel raise: 5 reps ea bilat WNL and no pain    DL squat: WNL no pain             SL squat: Depth equal bilaterally with no pain, mild forward knee flexion bilaterally.    Gait: Independent, WNL    Test measurements:     Palpation: No tenderness  ROM PROM AROM Comments     Left Right Left Right     Dorsiflexion     12 13     Plantarflexion     55 55     Inversion     31 32     Eversion     9 14     Orthopedic Special Tests:   Hamstring flexibility 165 deg L, 165 deg R measured 90/90       RESTRICTIONS/PRECAUTIONS: Calf strain on R 4/15    Exercises/Interventions:     Exercise/Equipment Resistance/Repetitions Other comments   ROM/ stretching     Hamstring stretch 5/17 supine    Calf stretch 30\"hx3 bilat ^5/24 slant board   Biodex AROM Start 5/24 Postural stability   Pro stretch Start 5/24    Fitter board Start 5/24   Dynamic warm up Start 6/8 1 lap ea                       Isometrics     Arch lifts ^5/24 standing    Plantarflexion     Inversion     Eversion          PRE's     Dorsiflexion     Plantarflexion ^5/24 eccentric return    Inversion     Eversion     Heel walk     Toe walk     SL heel raise 3x10 R Start 5/24 6/8 attempted to add weight but withheld d/t unilateral UE support required for balanc   Calf Raises @ step with eccentric lower 10\"hx10 R ^5/24 8\" step    SL RDL 3x10 R ^6/17 blue TB    LTD  6/17 progressed with SL Y combination    SL Y combination reach 1x10 ea on R 6/17   SL running balance 3x10 R 6/17         Balance:     Rocker Board     BOSU     SLS with hip abd and flexion Start 6/8    Aeromat    Foam Roll    Plyoback    Tandem Stance    Biodex        Agility ladder Start 6/8 3 laps    Jump squats  Start 6/8         Manual interventions     IASTM   Start 5/24        Therapeutic Exercise and NMR EXR  [x] (24300) Provided verbal/tactile cueing for activities related to strengthening, flexibility, endurance, ROM for improvements in LE, proximal hip, and core control with self care, mobility, lifting, ambulation.  [] (82309) Provided verbal/tactile cueing for activities related to improving balance, coordination, kinesthetic sense, posture, motor skill, proprioception  to assist with LE, proximal hip, and core control in self care, mobility, lifting, ambulation and eccentric single leg control.      NMR and Therapeutic Activities:    [x] (38550 or 18815) Provided verbal/tactile cueing for activities related to improving balance, coordination, kinesthetic sense, posture, motor skill, proprioception and motor activation to allow for proper function of core, proximal hip and LE with self care and ADLs  [] (56665) Gait Re-education- Provided training and instruction to the patient for proper LE, core and proximal hip recruitment and positioning and eccentric body weight control with ambulation re-education including up and down stairs     Home Exercise Program:    [x] (89907) Reviewed/Progressed HEP activities related to strengthening, flexibility, endurance, ROM of core, proximal hip and LE for functional self-care, mobility, lifting and ambulation/stair navigation   [] (30981)Reviewed/Progressed HEP activities related to improving balance, coordination, kinesthetic sense, posture, motor skill, proprioception of core, proximal hip and LE for self care, mobility, lifting, and ambulation/stair navigation      Manual Treatments:  PROM / STM / Oscillations-Mobs:  G-I, II, III, IV (PA's, Inf., Post.)  [] (35379) Provided manual therapy to mobilize LE, proximal hip and/or LS spine soft tissue/joints for the purpose of modulating pain, promoting relaxation,  increasing ROM, reducing/eliminating soft tissue swelling/inflammation/restriction, improving soft tissue extensibility and allowing for proper ROM for normal function with self care, mobility, lifting and ambulation. Modalities:      Charges:  Timed Code Treatment Minutes: 30'   Total Treatment Minutes: 4:05-4:40  28'     [] EVAL (LOW) 455 1011 (typically 20 minutes face-to-face)  [] EVAL (MOD) 88037 (typically 30 minutes face-to-face)  [] EVAL (HIGH) 03132 (typically 45 minutes face-to-face)  [] RE-EVAL     [x] IY(40583) x  1   [] IONTO  [] NMR (04805) x     [] VASO  [] Manual (03083) x      [] Other:  [x] TA x 1     [] Mech Traction (65285)  [] ES(attended) (09306)      [] ES (un) (97173):     GOALS:   Patient stated goal: Return to playing basketball     []? Progressing: [x]? Met: []? Not Met: []? Adjusted     Therapist goals for Patient:   Short Term Goals: To be achieved in: 2 weeks  1. Independent in HEP and progression per patient tolerance, in order to prevent re-injury. []? Progressing: [x]? Met: []? Not Met: []? Adjusted   2. Patient will have a decrease in pain to facilitate improvement in movement, function, and ADLs as indicated by Functional Deficits. []? Progressing: [x]? Met: []? Not Met: []? Adjusted     Long Term Goals: To be achieved in: 4 weeks  1.  Patient will score 87 on FOTO Lower leg assessment indicating patient ability to return to athletic abiliity. [x]? Progressing: []? Met: []? Not Met: []? Adjusted  2. Patient will demonstrate increased R LE hamstring flexibility that is equal to L to all for proper functional movement in weight bearing such as squatting without increase in ankle symptoms. []? Progressing: [x]? Met: []? Not Met: []? Adjusted  3. Patient will demonstrate an increase in eccentric strength in order to complete SL squat on R with normal depth and no compensatory movement in order to begin transition back to sport. []? Progressing: [x]? Met: []? Not Met: []? Adjusted  4. Patient will have increase in strength and flexibility in order to ambulate over 30 minutes with no c/o pain allowing for pt to begin return to running progression. []? Progressing: [x]? Met: []? Not Met: []? Adjusted         Overall Progression Towards Functional goals/ Treatment Progress Update:  [x] Patient is progressing as expected towards functional goals listed. [] Progression is slowed due to complexities/Impairments listed. [] Progression has been slowed due to co-morbidities. [] Plan just implemented, too soon to assess goals progression <30days   [] Goals require adjustment due to lack of progress  [] Patient is not progressing as expected and requires additional follow up with physician  [] Other    Prognosis for POC: [x] Good [] Fair  [] Poor      Patient requires continued skilled intervention: [x] Yes  [] No    Treatment/Activity Tolerance:  [x] Patient able to complete treatment  [] Patient limited by fatigue  [] Patient limited by pain     [] Patient limited by other medical complications  [] Other: 6/17 Pt is progressing well overall and is competent with HEP. Based on progress including pt returning to basketball as well as competence with HEP he is appropriate for discharge. Patient Education:              6/17 Updated HEP and reviewed proper progression with regards to time playing basketball.    6/8 Updated HEP and reviewed proper progression back to basketball including participating in only half court for 3 attempts before transitioning to full court. Educated on difference between pain and muscle soreness/fatigue. 5/24 Updated HEP   5/17 Educated on proper foot mechanics, proper muscle activation and importance of eccentirc strengthening to improve functional movement and assist with return to sport    HEP instruction:   Access Code: [facility]         PLAN: See eval  [] Continue per plan of care [] Jeff Fraga current plan (see comments above)  [] Plan of care initiated [] Hold pending MD visit [x] Discharge  6/17 Discharge from PT to continue with HEP independently as pt continues to progress time playing basketball. Electronically signed by:  Isadora Olivas PT    Note: If patient does not return for scheduled/ recommended follow up visits, this note will serve as a discharge from care along with most recent update on progress.

## 2022-08-22 ENCOUNTER — TELEPHONE (OUTPATIENT)
Dept: ORTHOPEDIC SURGERY | Age: 27
End: 2022-08-22

## 2022-08-22 ENCOUNTER — OFFICE VISIT (OUTPATIENT)
Dept: ORTHOPEDIC SURGERY | Age: 27
End: 2022-08-22
Payer: COMMERCIAL

## 2022-08-22 DIAGNOSIS — M25.562 LEFT KNEE PAIN, UNSPECIFIED CHRONICITY: Primary | ICD-10-CM

## 2022-08-22 PROCEDURE — 1036F TOBACCO NON-USER: CPT | Performed by: ORTHOPAEDIC SURGERY

## 2022-08-22 PROCEDURE — 99204 OFFICE O/P NEW MOD 45 MIN: CPT | Performed by: ORTHOPAEDIC SURGERY

## 2022-08-22 PROCEDURE — G8428 CUR MEDS NOT DOCUMENT: HCPCS | Performed by: ORTHOPAEDIC SURGERY

## 2022-08-22 PROCEDURE — G8419 CALC BMI OUT NRM PARAM NOF/U: HCPCS | Performed by: ORTHOPAEDIC SURGERY

## 2022-08-22 NOTE — PROGRESS NOTES
Date:  2022    Name:  Louisa Tse  Address:  [address] [address]    :  1995      Age:   27 y.o.    SSN:  [id number]      Medical Record Number:  [id number]    Reason for Visit:    Chief Complaint    Knee Pain (Old patient / new problem left knee )      DOS:2022     HPI: Louisa Tse is a 27 y.o. male here today for pain. Patient states he has had pain for 2 to 3 weeks during activities such as basketball. The pain is located in the anterior lateral aspect of the left knee, he has popping and clicking and catching. It sometimes will be painful and give way. He has no distinct injury. Been resting and has been improving. ROS: Review of systems reviewed from Patient History Form completed today and available in the patient's chart under the Media tab. No past medical history on file. No past surgical history on file. No family history on file. Social History     Socioeconomic History    Marital status: Unknown   Tobacco Use    Smoking status: Never    Smokeless tobacco: Never   Substance and Sexual Activity    Drug use: Never       No current outpatient medications on file. No current facility-administered medications for this visit. No Known Allergies    Vital signs: There were no vitals taken for this visit. Left knee exam    Gait: No use of assistive devices. No antalgic gait. Alignment: normal alignment. Inspection/skin: Skin is intact without erythema or ecchymosis. No gross deformity. Palpation: mild crepitus over the anterior lateral aspect of the knee. no joint line tenderness present. Range of Motion: There is full range of motion. Strength: Normal quadriceps development. Effusion: No effusion or swelling present. Ligamentous stability: No cruciate or collateral ligament instability. Neurologic and vascular: Skin is warm and well-perfused. Sensation is intact to light-touch.      Special tests: Negative concern for anterior horn lateral meniscus tear versus ganglion versus plica versus loose body. Plan: Pertinent imaging was reviewed. The etiology, natural history, and treatment options for the disorder were discussed. The roles of activity medication, antiinflammatories, injections, bracing, physical therapy, and surgical interventions were all described to the patient and questions were answered. Patient presents with insidious onset of left anterior lateral knee pain and popping. He has no distinct injury. Examination is significant for popping just lateral to the patellar tendon on the anterior knee. X-rays show normal left knee. Given the location as well as mechanical symptoms he is having over his anterior lateral left knee we would like to rule out a anterior horn lateral meniscus tear. This also could represent small loose body, ganglion cyst or plica. We will obtain an MRI to further evaluate the knee and see him back once this is done. Mark Graves is in agreement with this plan. All questions were answered to patient's satisfaction and was encouraged to call with any further questions. Total time spent for evaluation, education, and development of treatment plan: 55 minutes    Malik De La Torre MD    This dictation was performed with a verbal recognition program [facility]) and it was checked for errors. It is possible that there are still dictated errors within this office note. If so, please bring any areas to my attention for an addendum. All efforts were made to ensure that this office note is accurate. I attest that I met personally with the patient, performed the described exam, reviewed the radiographic studies and medical records associated with this patient and supervised the services that are described above.      Layla Preciado MD

## 2022-08-30 ENCOUNTER — OFFICE VISIT (OUTPATIENT)
Dept: ORTHOPEDIC SURGERY | Age: 27
End: 2022-08-30
Payer: COMMERCIAL

## 2022-08-30 VITALS — WEIGHT: 235 LBS | BODY MASS INDEX: 29.22 KG/M2 | HEIGHT: 75 IN

## 2022-08-30 DIAGNOSIS — S83.282A TEAR OF LATERAL MENISCUS OF LEFT KNEE, UNSPECIFIED TEAR TYPE, UNSPECIFIED WHETHER OLD OR CURRENT TEAR, INITIAL ENCOUNTER: Primary | ICD-10-CM

## 2022-08-30 PROCEDURE — G8419 CALC BMI OUT NRM PARAM NOF/U: HCPCS | Performed by: ORTHOPAEDIC SURGERY

## 2022-08-30 PROCEDURE — 1036F TOBACCO NON-USER: CPT | Performed by: ORTHOPAEDIC SURGERY

## 2022-08-30 PROCEDURE — G8427 DOCREV CUR MEDS BY ELIG CLIN: HCPCS | Performed by: ORTHOPAEDIC SURGERY

## 2022-08-30 PROCEDURE — 99214 OFFICE O/P EST MOD 30 MIN: CPT | Performed by: ORTHOPAEDIC SURGERY

## 2022-08-30 NOTE — PROGRESS NOTES
Chief Complaint  Follow-up (Mri left knee )      History of Present Illness:  Saúl Alaniz is a pleasant 27 y.o. male who presents today for follow up evaluation of left knee pain. He is here to review MRI results. Patient states he had pain for 3-4 weeks during activities such as orange theory and basketball. The pain is located in the anterior lateral aspect of the left knee, he has popping and clicking and catching. It sometimes will be painful and give way. He has no distinct injury. He has been resting and has been improving, in no pain today. Medical History:  Patient's medications, allergies, past medical, surgical, social and family histories were reviewed and updated as appropriate. Pertinent items are noted in HPI  Review of systems reviewed from Patient History Form completed today and available in the patient's chart under the Media tab. Pain Assessment  Location of Pain: Knee  Location Modifiers: Left  Severity of Pain: 2  Quality of Pain: Aching  Frequency of Pain: Constant  Limiting Behavior: Some  Relieving Factors: Rest, Ice  Result of Injury: Yes  Work-Related Injury: No  Are there other pain locations you wish to document?: No    History reviewed. No pertinent past medical history. History reviewed. No pertinent surgical history. History reviewed. No pertinent family history. Social History     Socioeconomic History    Marital status: Single     Spouse name: None    Number of children: 0    Years of education: None    Highest education level: None   Tobacco Use    Smoking status: Never    Smokeless tobacco: Never   Substance and Sexual Activity    Drug use: Never       No current outpatient medications on file. No current facility-administered medications for this visit. No Known Allergies    Vital signs:  Ht 6' 3\" (1.905 m)   Wt 235 lb (106.6 kg)   BMI 29.37 kg/m²              LEFT Knee Exam:    Gait: No use of assistive devices.  No antalgic etiology, natural history, and treatment options for the disorder were discussed. The roles of activity medication, antiinflammatories, injections, bracing, physical therapy, and surgical interventions were all described to the patient and questions were answered. MRI does show a lateral meniscus tear but patient is asymptomatic today. He has no tenderness, has normal range of motion, no swelling, and normal function. If symptoms were to return, we can consider surgical intervention. I will see him back if symptoms return. Bisi Prajapati is in agreement with this plan. All questions were answered to patient's satisfaction and was encouraged to call with any further questions. Total time spent for evaluation, education and development of treatment plan: 35 minutes        Estelita CUBA ATC, am scribing for and in the presence of Dr. Thong Romo. 08/30/22 3:37 PM Estelita Cline ATC      I attest that I met personally with the patient, performed the described exam, reviewed the radiographic studies and medical records associated with this patient and supervised the services that are described above.      Elvia St MD

## 2022-10-12 ENCOUNTER — TELEPHONE (OUTPATIENT)
Dept: ORTHOPEDIC SURGERY | Age: 27
End: 2022-10-12

## 2022-10-14 ENCOUNTER — OFFICE VISIT (OUTPATIENT)
Dept: ORTHOPEDIC SURGERY | Age: 27
End: 2022-10-14
Payer: COMMERCIAL

## 2022-10-14 VITALS — BODY MASS INDEX: 27.98 KG/M2 | HEIGHT: 75 IN | WEIGHT: 225 LBS

## 2022-10-14 DIAGNOSIS — S83.282A TEAR OF LATERAL MENISCUS OF LEFT KNEE, UNSPECIFIED TEAR TYPE, UNSPECIFIED WHETHER OLD OR CURRENT TEAR, INITIAL ENCOUNTER: Primary | ICD-10-CM

## 2022-10-14 PROCEDURE — 1036F TOBACCO NON-USER: CPT | Performed by: ORTHOPAEDIC SURGERY

## 2022-10-14 PROCEDURE — G8484 FLU IMMUNIZE NO ADMIN: HCPCS | Performed by: ORTHOPAEDIC SURGERY

## 2022-10-14 PROCEDURE — 99214 OFFICE O/P EST MOD 30 MIN: CPT | Performed by: ORTHOPAEDIC SURGERY

## 2022-10-14 PROCEDURE — G8419 CALC BMI OUT NRM PARAM NOF/U: HCPCS | Performed by: ORTHOPAEDIC SURGERY

## 2022-10-14 PROCEDURE — G8427 DOCREV CUR MEDS BY ELIG CLIN: HCPCS | Performed by: ORTHOPAEDIC SURGERY

## 2022-10-14 NOTE — PROGRESS NOTES
Chief Complaint  Follow-up (Left knee. )      History of Present Illness:  Stephany Hunter is a pleasant 32 y.o. male here for follow up regarding his left knee. As a review, and conservatively for a anterior horn of the lateral meniscus tear. He has been working with physical therapy, and had been doing quite well however patient noticed that with his increased activity with basketball he has had worsening pain. He also notices some swelling that is focal to the anterior lateral part of his knee. Patient is quite active, he plays basketball participates in [facility] and would like to continue doing this. He is here today to talk about long-term options. Medical History:  Patient's medications, allergies, past medical, surgical, social and family histories were reviewed and updated as appropriate. Pain Assessment  Location of Pain: Knee  Location Modifiers: Left  Severity of Pain: 1  Quality of Pain: Dull  Duration of Pain: A few minutes  Frequency of Pain: Intermittent  Limiting Behavior: Some  Relieving Factors: Rest, Ice  Result of Injury: Yes  Work-Related Injury: No  Are there other pain locations you wish to document?: No  ROS: Review of systems reviewed from Patient History Form completed today and available in the patient's chart under the Media tab. Pertinent items are noted in HPI  Review of systems reviewed from Patient History Form completed today and available in the patient's chart under the Media tab. Vital Signs:  Ht 6' 3\" (1.905 m)   Wt 225 lb (102.1 kg)   BMI 28.12 kg/m²     Lateral knee exam    Gait: No use of assistive devices. No antalgic gait. Alignment: normal alignment. Inspection/skin: Skin is intact without erythema or ecchymosis. No gross deformity. Palpation: Marked anterior lateral joint line tenderness. no lateral joint line tenderness present. no crepitus. no pain with compression of the patella. No bursal swelling is present.      Range of Motion: There is full range of motion. Strength: Normal quadriceps development. Effusion: Small effusion. No swelling present. Ligamentous stability: No cruciate or collateral ligament instability. Anterior and posterior drawer signs are negative. Lachman sign is negative. Neurologic and vascular: Skin is warm and well-perfused. There is no pretibial edema. Pulses are symmetric. Sensation is intact to light-touch    Special tests:  Positive hyperflexion test. Positive Jamie sign. The patella apprehension sign is negative. Radiology:       Pertinent imaging was interpreted and reviewed with the patient today, both images and report. MRI of the left knee dated 8/26/2022 was interpreted and reviewed with the patient today. CONCLUSION:   1. Torn anterior horn root lateral meniscus. 2. Small cartilage ulcer posterolateral femoral condyle. Mild marrow reaction. 3. Small effusion. Assessment :  27year old male with lateral meniscus tear of left knee, asymptomatic    Impression:  Encounter Diagnosis   Name Primary? Tear of lateral meniscus of left knee, unspecified tear type, unspecified whether old or current tear, initial encounter Yes       Office Procedures:  No orders of the defined types were placed in this encounter. Plan: Pertinent imaging was reviewed. The etiology, natural history, and treatment options for the disorder were discussed. The roles of activity medication, antiinflammatories, injections, bracing, physical therapy, and surgical interventions were all described to the patient and questions were answered. Patient has a anterior horn lateral meniscus tear. This is causing swelling and discomfort with basketball. He has tried PT, antiinflammatories, activity modification for 4 months with no improvement. At this time he is a candidate for a left knee arthroscopy with lateral meniscus repair vs excision. He would like to proceed with this. Marita Moctezuma is in agreement with this plan. All questions were answered to patient's satisfaction and was encouraged to call with any further questions. Total time spent for evaluation, education, and development of treatment plan: 39 minutes    Lavonne Oconnell, [facility]  10/14/2022    During this exam, I, Lavonne Oconnell PA-C, functioned as a scribe for Dr. Erick Wray. The history taking and physical examination were performed by Dr. Erick Wray. All counseling during the appointment was performed between the patient and Dr. Erick Wray. 10/14/2022 9:31 AM    This dictation was performed with a verbal recognition program (DRAGON) and it was checked for errors. It is possible that there are still dictated errors within this office note. If so, please bring any areas to my attention for an addendum. All efforts were made to ensure that this office note is accurate. I attest that I met personally with the patient, performed the described exam, reviewed the radiographic studies and medical records associated with this patient and supervised the services that are described above.      Dangelo Johnson MD

## 2023-01-13 ENCOUNTER — TELEPHONE (OUTPATIENT)
Dept: ORTHOPEDIC SURGERY | Age: 28
End: 2023-01-13

## 2023-01-13 NOTE — TELEPHONE ENCOUNTER
Surgery and/or Procedure Scheduling     Contact Name: Kimberly Walker"    Surgical/Procedure Request: Pascale Hansondavid Knee/Meniscus    Patient Contact Number: [phone]     Patient calling for Sx scheduling. *Please leave a message if unanswered* at above tele#.

## 2023-02-06 SDOH — HEALTH STABILITY: PHYSICAL HEALTH: ON AVERAGE, HOW MANY MINUTES DO YOU ENGAGE IN EXERCISE AT THIS LEVEL?: 70 MIN

## 2023-02-06 SDOH — HEALTH STABILITY: PHYSICAL HEALTH: ON AVERAGE, HOW MANY DAYS PER WEEK DO YOU ENGAGE IN MODERATE TO STRENUOUS EXERCISE (LIKE A BRISK WALK)?: 4 DAYS

## 2023-02-06 ASSESSMENT — SOCIAL DETERMINANTS OF HEALTH (SDOH)
WITHIN THE LAST YEAR, HAVE YOU BEEN HUMILIATED OR EMOTIONALLY ABUSED IN OTHER WAYS BY YOUR PARTNER OR EX-PARTNER?: NO
WITHIN THE LAST YEAR, HAVE YOU BEEN AFRAID OF YOUR PARTNER OR EX-PARTNER?: NO
WITHIN THE LAST YEAR, HAVE TO BEEN RAPED OR FORCED TO HAVE ANY KIND OF SEXUAL ACTIVITY BY YOUR PARTNER OR EX-PARTNER?: NO
WITHIN THE LAST YEAR, HAVE YOU BEEN KICKED, HIT, SLAPPED, OR OTHERWISE PHYSICALLY HURT BY YOUR PARTNER OR EX-PARTNER?: NO

## 2023-02-07 ENCOUNTER — OFFICE VISIT (OUTPATIENT)
Dept: FAMILY MEDICINE CLINIC | Age: 28
End: 2023-02-07
Payer: COMMERCIAL

## 2023-02-07 VITALS
DIASTOLIC BLOOD PRESSURE: 62 MMHG | WEIGHT: 233 LBS | HEIGHT: 75 IN | OXYGEN SATURATION: 98 % | SYSTOLIC BLOOD PRESSURE: 128 MMHG | HEART RATE: 75 BPM | BODY MASS INDEX: 28.97 KG/M2

## 2023-02-07 DIAGNOSIS — Z83.3 FAMILY HISTORY OF DIABETES MELLITUS: ICD-10-CM

## 2023-02-07 DIAGNOSIS — S83.282D TEAR OF LATERAL MENISCUS OF LEFT KNEE, UNSPECIFIED TEAR TYPE, UNSPECIFIED WHETHER OLD OR CURRENT TEAR, SUBSEQUENT ENCOUNTER: ICD-10-CM

## 2023-02-07 DIAGNOSIS — Z01.818 PREOP GENERAL PHYSICAL EXAM: Primary | ICD-10-CM

## 2023-02-07 DIAGNOSIS — Z13.220 SCREENING FOR LIPID DISORDERS: ICD-10-CM

## 2023-02-07 DIAGNOSIS — Z13.0 SCREENING FOR IRON DEFICIENCY ANEMIA: ICD-10-CM

## 2023-02-07 PROCEDURE — 99213 OFFICE O/P EST LOW 20 MIN: CPT | Performed by: NURSE PRACTITIONER

## 2023-02-07 PROCEDURE — G8484 FLU IMMUNIZE NO ADMIN: HCPCS | Performed by: NURSE PRACTITIONER

## 2023-02-07 PROCEDURE — G8427 DOCREV CUR MEDS BY ELIG CLIN: HCPCS | Performed by: NURSE PRACTITIONER

## 2023-02-07 PROCEDURE — G8419 CALC BMI OUT NRM PARAM NOF/U: HCPCS | Performed by: NURSE PRACTITIONER

## 2023-02-07 SDOH — ECONOMIC STABILITY: FOOD INSECURITY: WITHIN THE PAST 12 MONTHS, YOU WORRIED THAT YOUR FOOD WOULD RUN OUT BEFORE YOU GOT MONEY TO BUY MORE.: NEVER TRUE

## 2023-02-07 SDOH — ECONOMIC STABILITY: FOOD INSECURITY: WITHIN THE PAST 12 MONTHS, THE FOOD YOU BOUGHT JUST DIDN'T LAST AND YOU DIDN'T HAVE MONEY TO GET MORE.: NEVER TRUE

## 2023-02-07 SDOH — ECONOMIC STABILITY: HOUSING INSECURITY
IN THE LAST 12 MONTHS, WAS THERE A TIME WHEN YOU DID NOT HAVE A STEADY PLACE TO SLEEP OR SLEPT IN A SHELTER (INCLUDING NOW)?: NO

## 2023-02-07 SDOH — ECONOMIC STABILITY: INCOME INSECURITY: HOW HARD IS IT FOR YOU TO PAY FOR THE VERY BASICS LIKE FOOD, HOUSING, MEDICAL CARE, AND HEATING?: NOT HARD AT ALL

## 2023-02-07 ASSESSMENT — PATIENT HEALTH QUESTIONNAIRE - PHQ9
SUM OF ALL RESPONSES TO PHQ QUESTIONS 1-9: 0
SUM OF ALL RESPONSES TO PHQ QUESTIONS 1-9: 0
2. FEELING DOWN, DEPRESSED OR HOPELESS: 0
1. LITTLE INTEREST OR PLEASURE IN DOING THINGS: 0
SUM OF ALL RESPONSES TO PHQ9 QUESTIONS 1 & 2: 0
SUM OF ALL RESPONSES TO PHQ QUESTIONS 1-9: 0
SUM OF ALL RESPONSES TO PHQ QUESTIONS 1-9: 0

## 2023-02-07 ASSESSMENT — ENCOUNTER SYMPTOMS
VOMITING: 0
SHORTNESS OF BREATH: 0
NAUSEA: 0
ABDOMINAL PAIN: 0
RHINORRHEA: 0
DIARRHEA: 0

## 2023-02-07 NOTE — PROGRESS NOTES
Preoperative Consultation    Jacki Petit  YOB: 1995    This patient presents to the office today for a preoperative consultation at the request of surgeon, Letitia El, who plans on performing meniscus repair. Left meniscus tear  Started in August- was traveling on a trip  Was gradual  Was working out and playing sports- no injuries; now is not able to go the gym consistently  Has popping  No weakness and numbness- can get stiff and locks up (mostly on recovery days)  Did x-ray and MRI  Did not recommend PT    No personal or FH of problems with anesthesia. No personal problems with bleeding or clotting. Uncle with hx of DVT. There is no problem list on file for this patient. No past surgical history on file. No Known Allergies  No outpatient medications have been marked as taking for the 2/7/23 encounter (Office Visit) with JOSE Wang CNP. Social History     Tobacco Use    Smoking status: Never    Smokeless tobacco: Never   Substance Use Topics    Alcohol use: Not on file     No family history on file. Review of Systems:  Review of Systems   Constitutional:  Positive for activity change. Negative for appetite change, fatigue and fever. HENT:  Negative for congestion and rhinorrhea. Respiratory:  Negative for shortness of breath. Cardiovascular:  Negative for chest pain. Gastrointestinal:  Negative for abdominal pain, diarrhea, nausea and vomiting. Musculoskeletal:         Left knee pain   Neurological:  Negative for dizziness, weakness and headaches. Objective:     /62   Pulse 75   Ht 6' 3\" (1.905 m)   Wt 233 lb (105.7 kg)   SpO2 98%   BMI 29.12 kg/m²  Weight: 233 lb (105.7 kg)   Physical Exam  Vitals reviewed. Constitutional:       Appearance: Normal appearance. HENT:      Head: Normocephalic.       Right Ear: Tympanic membrane, ear canal and external ear normal.      Left Ear: Tympanic membrane, ear canal and external ear normal.      Nose: Nose normal.      Mouth/Throat:      Lips: Pink. Mouth: Mucous membranes are moist.      Pharynx: Oropharynx is clear. Uvula midline. Cardiovascular:      Rate and Rhythm: Normal rate and regular rhythm. Pulses: Normal pulses. Heart sounds: Normal heart sounds, S1 normal and S2 normal.   Pulmonary:      Effort: Pulmonary effort is normal.      Breath sounds: Normal breath sounds and air entry. Abdominal:      Palpations: Abdomen is soft. Tenderness: There is no abdominal tenderness. Musculoskeletal:      Left knee: Tenderness present. Right lower leg: No edema. Left lower leg: No edema. Neurological:      Mental Status: He is alert. Psychiatric:         Mood and Affect: Mood normal.       Lab Review No       Assessment:       1. Preop general physical exam  Stable;  Patient cleared for planned procedure. 2. Tear of lateral meniscus of left knee, unspecified tear type, unspecified whether old or current tear, subsequent encounter  Stable;  See 1    3. Screening for lipid disorders  Stable;  Labs ordered. - Lipid Panel; Future  - Comprehensive Metabolic Panel; Future    4. Screening for iron deficiency anemia  Stable;  Labs ordered. - CBC; Future    5. Family history of diabetes mellitus  Stable;  Labs ordered. - Lipid Panel; Future  - Hemoglobin A1C; Future   32 y.o. patient approved for Surgery         Plan:     1. Preoperative workup as follows: none  2. Change in medication regimen before surgery: Follow directions from surgeon.   3. No contraindications to planned surgery    JOSE Patino - CNP

## 2023-02-07 NOTE — H&P (VIEW-ONLY)
Preoperative Consultation    Fady Angela  YOB: 1995    This patient presents to the office today for a preoperative consultation at the request of surgeon, , who plans on performing meniscus repair.    Left meniscus tear  Started in August- was traveling on a trip  Was gradual  Was working out and playing sports- no injuries; now is not able to go the gym consistently  Has popping  No weakness and numbness- can get stiff and locks up (mostly on recovery days)  Did x-ray and MRI  Did not recommend PT    No personal or FH of problems with anesthesia.  No personal problems with bleeding or clotting.  Uncle with hx of DVT.    There is no problem list on file for this patient.    No past surgical history on file.    No Known Allergies  No outpatient medications have been marked as taking for the 2/7/23 encounter (Office Visit) with JOSE Gong CNP.       Social History     Tobacco Use    Smoking status: Never    Smokeless tobacco: Never   Substance Use Topics    Alcohol use: Not on file     No family history on file.    Review of Systems:  Review of Systems   Constitutional:  Positive for activity change. Negative for appetite change, fatigue and fever.   HENT:  Negative for congestion and rhinorrhea.    Respiratory:  Negative for shortness of breath.    Cardiovascular:  Negative for chest pain.   Gastrointestinal:  Negative for abdominal pain, diarrhea, nausea and vomiting.   Musculoskeletal:         Left knee pain   Neurological:  Negative for dizziness, weakness and headaches.       Objective:     /62   Pulse 75   Ht 6' 3\" (1.905 m)   Wt 233 lb (105.7 kg)   SpO2 98%   BMI 29.12 kg/m²  Weight: 233 lb (105.7 kg)   Physical Exam  Vitals reviewed.   Constitutional:       Appearance: Normal appearance.   HENT:      Head: Normocephalic.      Right Ear: Tympanic membrane, ear canal and external ear normal.      Left Ear: Tympanic membrane, ear canal and external ear  normal.      Nose: Nose normal.      Mouth/Throat:      Lips: Pink. Mouth: Mucous membranes are moist.      Pharynx: Oropharynx is clear. Uvula midline. Cardiovascular:      Rate and Rhythm: Normal rate and regular rhythm. Pulses: Normal pulses. Heart sounds: Normal heart sounds, S1 normal and S2 normal.   Pulmonary:      Effort: Pulmonary effort is normal.      Breath sounds: Normal breath sounds and air entry. Abdominal:      Palpations: Abdomen is soft. Tenderness: There is no abdominal tenderness. Musculoskeletal:      Left knee: Tenderness present. Right lower leg: No edema. Left lower leg: No edema. Neurological:      Mental Status: He is alert. Psychiatric:         Mood and Affect: Mood normal.       Lab Review No       Assessment:       1. Preop general physical exam  Stable;  Patient cleared for planned procedure. 2. Tear of lateral meniscus of left knee, unspecified tear type, unspecified whether old or current tear, subsequent encounter  Stable;  See 1    3. Screening for lipid disorders  Stable;  Labs ordered. - Lipid Panel; Future  - Comprehensive Metabolic Panel; Future    4. Screening for iron deficiency anemia  Stable;  Labs ordered. - CBC; Future    5. Family history of diabetes mellitus  Stable;  Labs ordered. - Lipid Panel; Future  - Hemoglobin A1C; Future   32 y.o. patient approved for Surgery         Plan:     1. Preoperative workup as follows: none  2. Change in medication regimen before surgery: Follow directions from surgeon.   3. No contraindications to planned surgery    JOSE Bella - CNP

## 2023-02-10 ENCOUNTER — TELEPHONE (OUTPATIENT)
Dept: ORTHOPEDIC SURGERY | Age: 28
End: 2023-02-10

## 2023-02-13 ENCOUNTER — TELEPHONE (OUTPATIENT)
Dept: FAMILY MEDICINE CLINIC | Age: 28
End: 2023-02-13

## 2023-02-13 NOTE — TELEPHONE ENCOUNTER
Medication:   Requested Prescriptions      No prescriptions requested or ordered in this encounter        Last Filled:      Patient Phone Number: [phone] (home)     Last appt: 2/7/2023   Next appt: Visit date not found    Last OARRS: No flowsheet data found.

## 2023-02-14 NOTE — PROGRESS NOTES
Place patient label inside box (if no patient label, complete below)  Name:  :  MR#:     Melody Wilder / PROCEDURE  I (we), Kenna Palmer  (Patient Name) authorize DR Caitlin Arguello MD  (Provider / Sara Palacio) and/or such assistants as may be selected by him/her, to perform the following operation/procedure(s): LEFT KNEE ARTHROSCOPY, LATERAL MENISCUS REPAIR VERSUS EXCISION, SYNOVECTOMY       Note: If unable to obtain consent prior to an emergent procedure, document the emergent reason in the medical record. This procedure has been explained to my (our) satisfaction and included in the explanation was: The intended benefit, nature, and extent of the procedure to be performed; The significant risks involved and the probability of success; Alternative procedures and methods of treatment; The dangers and probable consequences of such alternatives (including no procedure or treatment); The expected consequences of the procedure on my future health; Whether other qualified individuals would be performing important surgical tasks and/or whether  would be present to advise or support the procedure. I (we) understand that there are other risks of infection and other serious complications in the pre-operative/procedural and postoperative/procedural stages of my (our) care. I (we) have asked all of the questions which I (we) thought were important in deciding whether or not to undergo treatment or diagnosis. These questions have been answered to my (our) satisfaction. I (we) understand that no assurance can be given that the procedure will be a success, and no guarantee or warranty of success has been given to me (us).     It has been explained to me (us) that during the course of the operation/procedure, unforeseen conditions may be revealed that necessitate extension of the original procedure(s) or different procedure(s) than those set forth in Paragraph 1. I (we) authorize and request that the above-named physician, his/her assistants or his/her designees, perform procedures as necessary and desirable if deemed to be in my (our) best interest.     Revised 8/2/2021                                                                          Page 1 of 2         I acknowledge that health care personnel may be observing this procedure for the purpose of medical education or other specified purposes as may be necessary as requested and/or approved by my (our) physician. I (we) consent to the disposal by the hospital Pathologist of the removed tissue, parts or organs in accordance with hospital policy. I do ____ do not ____ consent to the use of a local infiltration pain blocking agent that will be used by my provider/surgical provider to help alleviate pain during my procedure. I do ____ do not ____ consent to an emergent blood transfusion in the case of a life-threatening situation that requires blood components to be administered. This consent is valid for 24 hours from the beginning of the procedure. This patient does ____ or does not ____ currently have a DNR status/order. If DNR order is in place, obtain Addendum to the Surgical Consent for ALL Patients with a DNR Order to address aristides-operative status for limited intervention or DNR suspension.      I have read and fully understand the above Consent for Operation/Procedure and that all blanks were completed before I signed the consent.   _____________________________       _____________________      ____/____am/pm  Signature of Patient or legal representative      Printed Name / Relationship            Date / Time   ____________________________       _____________________      ____/____am/pm  Witness to Signature                                    Printed Name                    Date / Time    If patient is unable to sign or is a minor, complete the following)  Patient is a minor, ____ years of age, or unable to sign because:   ______________________________________________________________________________________________    If a phone consent is obtained, consent will be documented by using two health care professionals, each affirming that the consenting party has no questions and gives consent for the procedure discussed with the physician/provider.   _____________________          ____________________       _____/_____am/pm   2nd witness to phone consent        Printed name           Date / Time    Informed Consent:  I have provided the explanation described above in section 1 to the patient and/or legal representative.  I have provided the patient and/or legal representative with an opportunity to ask any questions about the proposed operation/procedure.   ___________________________          ____________________         ____/____am/pm  Provider / Proceduralist                            Printed name            Date / Time  Revised 8/2/2021                                                                      Page 2 of 2

## 2023-02-14 NOTE — PROGRESS NOTES
[facility] PRE-SURGICAL TESTING INSTRUCTIONS                      PRIOR TO PROCEDURE DATE:    1. PLEASE FOLLOW ANY INSTRUCTIONS GIVEN TO YOU PER YOUR SURGEON. 2. Arrange for someone to drive you home and be with you for the first 24 hours after discharge for your safety after your procedure for which you received sedation. Ensure it is someone we can share information with regarding your discharge. NOTE: At this time ONLY 2 ADULTS may accompany you. MASKS are no longer required but they are encouraged. 3. You must contact your surgeon for instructions IF:  You are taking any blood thinners, aspirin, anti-inflammatory or vitamins. There is a change in your physical condition such as a cold, fever, rash, cuts, sores, or any other infection, especially near your surgical site. 4. Do not drink alcohol the day before or day of your procedure. Do not use any recreational marijuana at least 24 hours or street drugs (heroin, cocaine) at minimum 5 days prior to your procedure. 5. A Pre-Surgical History and Physical MUST be completed WITHIN 30 DAYS OR LESS prior to your procedure. by your Physician or an Urgent Care        THE DAY OF YOUR PROCEDURE:  1. Follow instructions for ARRIVAL TIME as DIRECTED BY YOUR SURGEON. 2. Enter the MAIN entrance from [address] and follow the signs to the free [place]. 3. Enter the Main Entrance of the hospital (do not enter from the lower level of the parking garage). Upon entrance, check in with the  at the surgical information desk on your LEFT. Bring your insurance card and photo ID to register      4. DO NOT EAT ANYTHING 8 hours prior to arrival for surgery. You may have up to 6 ounces of water 4 hours prior to your arrival for surgery in small sips.   NOTE: ALL Gastric, Bariatric & Bowel surgery patients - you MUST follow your surgeon's instructions regarding eating/ drinking as you will have very specific instructions to follow. If you did not receive these, call your surgeon's office immediately. 5. MEDICATIONS:  Take the following medications with a SMALL sip of water: NONE  Use your usual dose of inhalers the morning of surgery. BRING your rescue inhaler with you to hospital.   Anesthesia does NOT want you to take insulin the morning of surgery. They will control your blood sugar while you are at the hospital. Please contact your ordering physician for instructions regarding your insulin the night before your procedure. If you have an insulin pump, please keep it set on basal rate. Bariatric patient's:  Call your surgeon if on diabetic medications as some may need to be stopped 1 week prior to surgery    6. Do not swallow additional water when brushing teeth. No gum, candy, mints, or ice chips. Refrain from smoking or at least decrease the amount on day of surgery. 7. Morning of surgery:   Take a shower with an antibacterial soap (i.e., Safeguard or Dial) OR your physician may have instructed you to use Hibiclens. Dress in loose, comfortable clothing appropriate for redressing after your procedure. Do not wear jewelry (including body piercings), NO make-up (especially NO eye make-up including no mascara; no false eyelashes). Please remove fingernail polish. If you have acrylic nails, please remove the polish off of one finger so the oxygen monitoring equipment can do its job. NO toenail polish if foot/leg surgery. Do not apply any lotion, powders or oils. Remove all metal hair clips. Do not shave or wax for 72 hours prior to procedure near your operative site. Shaving with a razor can irritate your skin and make it easier to develop an infection. On the day of your procedure, any hair that needs to be removed near the surgical site will be 'clipped' by a healthcare worker using a special clipper designed to avoid skin irritation. 8. Dentures, glasses, or contacts will need to be removed before your procedure. Bring cases for your glasses, contacts, dentures, or hearing aids to protect them while you are in surgery. 9. If you use a CPAP, please bring it with you on the day of your procedure. 10. We recommend that valuable personal belongings such as cash, cell phones, e-tablets, or jewelry, be left at home during your stay. The hospital will not be responsible for valuables that are not secured in the hospital safe. However, if your insurance requires a co-pay, you may want to bring a method of payment, i.e., Check or credit card, if you wish to pay your co-pay the day of surgery. 11. If you are to stay overnight, you may bring a bag with personal items. Please have any large items you may need brought in by your family after your arrival to your hospital room. 12. If you have a Living Will or Durable Power of , please bring a copy on the day of your procedure. How we keep you safe and work to prevent surgical site infections:   1. Health care workers should always check your ID bracelet to verify your name and birth date. You will be asked many times to state your name, date of birth, and allergies. 2. Health care workers should always clean their hands with soap or alcohol gel before providing care to you. It is okay to ask anyone if they cleaned their hands before they touch you. 3. You will be actively involved in verifying the type of procedure you are having and ensuring the correct surgical site. This will be confirmed multiple times prior to your procedure. Do NOT dwight your surgery site UNLESS instructed to by your surgeon. 4. When you are in the operating room, your surgical site will be cleansed with a special soap, and in most cases, you will be given an antibiotic before the surgery begins. What to expect AFTER your procedure? 1. Immediately following your procedure, your will be taken to the PACU for the first phase of your recovery.   Your nurse will help you recover from any potential side effects of anesthesia, such as extreme drowsiness, changes in your vital signs or breathing patterns. Nausea, headache, muscle aches, or sore throat may also occur after anesthesia. Your nurse will help you manage these potential side effects. 2. For comfort and safety, arrange to have someone at home with you for the first 24 hours after discharge. 3. You and your family will be given written instructions about your diet, activity, dressing care, medications, and return visits. 4. Once at home, should issues with nausea, pain, or bleeding occur, or should you notice any signs of infection, you should call your surgeon. 5. Always clean your hands before and after caring for your wound. Do not let your family touch your surgery site without cleaning their hands. 6. Narcotic pain medications can cause significant constipation. You may want to add a stool softener to your postoperative medication schedule or speak to your surgeon on how best to manage this SIDE EFFECT. SPECIAL INSTRUCTIONS: FOLLOW ALL INSTRUCTIONS AS GIVEN TO YOU BY YOUR SURGEON     Thank you for allowing us to care for you. We strive to exceed your expectations in the delivery of care and service provided to you and your family. If you need to contact the [place] [address] staff for any reason, please call us at [phone]    Instructions reviewed with patient during preadmission testing phone interview.   Dom Mcgarry RN.2/14/2023 .12:09 PM      ADDITIONAL EDUCATIONAL INFORMATION REVIEWED PER PHONE WITH YOU AND/OR YOUR FAMILY:  Yes Hibiclens® Bathing Instructions   No Antibacterial Soap

## 2023-02-15 ENCOUNTER — OFFICE VISIT (OUTPATIENT)
Dept: ORTHOPEDIC SURGERY | Age: 28
End: 2023-02-15

## 2023-02-15 VITALS — HEIGHT: 75 IN | WEIGHT: 233 LBS | BODY MASS INDEX: 28.97 KG/M2

## 2023-02-15 DIAGNOSIS — S83.282A TEAR OF LATERAL MENISCUS OF LEFT KNEE, UNSPECIFIED TEAR TYPE, UNSPECIFIED WHETHER OLD OR CURRENT TEAR, INITIAL ENCOUNTER: Primary | ICD-10-CM

## 2023-02-15 NOTE — PROGRESS NOTES
Patient seen for follow-up evaluation of his left knee.  Chief complaint is left knee pain.    Patient has had an MRI showing has a tear in the anterior horn of his lateral meniscus.  He complains of continued knee popping and clicking associated with twisting turning movements.  He has pain after playing sports.  Symptoms have been ongoing now for several months.  Has not seen any improvement.  MRI shows a tear involving the anterior horn of the lateral meniscus.  He is preop for left knee arthroscopy with partial lateral meniscectomy versus repair.    Pain Assessment  Location of Pain: Knee  Location Modifiers: Left  Severity of Pain: 0  Aggravating Factors: Bending, Squatting  Limiting Behavior: Some  Result of Injury: No  Work-Related Injury: No  Are there other pain locations you wish to document?: No    No past medical history on file.     No past surgical history on file.    No family history on file.    Social History     Socioeconomic History    Marital status: Single     Spouse name: None    Number of children: 0    Years of education: None    Highest education level: None   Tobacco Use    Smoking status: Never    Smokeless tobacco: Never   Vaping Use    Vaping Use: Never used   Substance and Sexual Activity    Alcohol use: Yes     Comment: SOCIALLY    Drug use: Yes     Types: Marijuana (Weed)     Comment: COUPLE TIMES A MONTH     Social Determinants of Health     Financial Resource Strain: Low Risk     Difficulty of Paying Living Expenses: Not hard at all   Food Insecurity: No Food Insecurity    Worried About Running Out of Food in the Last Year: Never true    Ran Out of Food in the Last Year: Never true   Transportation Needs: Unknown    Lack of Transportation (Non-Medical): No   Physical Activity: Sufficiently Active    Days of Exercise per Week: 4 days    Minutes of Exercise per Session: 70 min   Intimate Partner Violence: Not At Risk    Fear of Current or Ex-Partner: No    Emotionally Abused: No     Physically Abused: No    Sexually Abused: No   Housing Stability: Unknown    Unstable Housing in the Last Year: No       No current outpatient medications on file. No current facility-administered medications for this visit. No Known Allergies    Vital signs:  Ht 6' 3\" (1.905 m)   Wt 233 lb (105.7 kg)   BMI 29.12 kg/m²      Constitution: Patient cooperative with examination today. Well-developed, well-nourished in no acute distress. Neuro: Alert & oriented x 3,  no focal motor or sensory deficits noted. Eyes: sclera clear, atraumatic  Ears: Normal external ear  Mouth:  No perioral lesions  Pulm: Respirations unlabored and regular  Pulse: Extremities well-perfused. 2+ peripheral pulses   Skin: Warm, no ulcerations      Examination the patient's left knee shows no effusion full range of motion but he has palpable crepitus over the anterolateral joint line associated with active knee extension. Cruciate and collateral ligaments are intact. No effusion. No significant pain with compression of patella. No patellar tendon tenderness. Skin warm dry well-perfused. Neurovascular intact. I reviewed the patient's MRI report and it shows that he has tearing of the anterior horn of the lateral meniscus. I reviewed the films with him. Treatment plan is going to be for left knee arthroscopy partial lateral meniscectomy. Risk benefits surgery as well as nonsurgical alternatives and the time required for rehabilitation were discussed with the patient understood consent to the operation. Time spent reviewing the patients medical records, performing the history and physical exam, reviewing diagnostic studies, developing and communicating the treatment plan and answering questions was: 30 minutes. I personally reviewed the patient's pain scale, review of systems, family history, social history, past medical history, allergies and medications.   Review of systems was collected today, reviewed and is included in the medical record. It is available under the media tab. Layla Preciado MD  Sports Medicine, Knee and Shoulder Surgery    This dictation was performed with a verbal recognition program [facility]) and it was checked for errors. It is possible that there are still dictated errors within this office note. If so, please bring any errors to my attention for an addendum. All efforts were made to ensure that this office note is accurate.

## 2023-02-22 ENCOUNTER — ANESTHESIA EVENT (OUTPATIENT)
Dept: OPERATING ROOM | Age: 28
End: 2023-02-22
Payer: COMMERCIAL

## 2023-02-23 ENCOUNTER — HOSPITAL ENCOUNTER (OUTPATIENT)
Age: 28
Setting detail: OUTPATIENT SURGERY
Discharge: HOME | End: 2023-02-23
Attending: ORTHOPAEDIC SURGERY | Admitting: ORTHOPAEDIC SURGERY
Payer: COMMERCIAL

## 2023-02-23 ENCOUNTER — ANESTHESIA (OUTPATIENT)
Dept: OPERATING ROOM | Age: 28
End: 2023-02-23
Payer: COMMERCIAL

## 2023-02-23 VITALS
RESPIRATION RATE: 16 BRPM | DIASTOLIC BLOOD PRESSURE: 72 MMHG | WEIGHT: 222.2 LBS | SYSTOLIC BLOOD PRESSURE: 124 MMHG | HEART RATE: 60 BPM | OXYGEN SATURATION: 100 % | TEMPERATURE: 97.2 F | HEIGHT: 75 IN | BODY MASS INDEX: 27.63 KG/M2

## 2023-02-23 DIAGNOSIS — Z98.890 S/P KNEE SURGERY: Primary | ICD-10-CM

## 2023-02-23 PROCEDURE — 3600000004 HC SURGERY LEVEL 4 BASE: Performed by: ORTHOPAEDIC SURGERY

## 2023-02-23 PROCEDURE — 2580000003 HC RX 258: Performed by: ANESTHESIOLOGY

## 2023-02-23 PROCEDURE — 7100000010 HC PHASE II RECOVERY - FIRST 15 MIN: Performed by: ORTHOPAEDIC SURGERY

## 2023-02-23 PROCEDURE — 3700000000 HC ANESTHESIA ATTENDED CARE: Performed by: ORTHOPAEDIC SURGERY

## 2023-02-23 PROCEDURE — 6360000002 HC RX W HCPCS: Performed by: ORTHOPAEDIC SURGERY

## 2023-02-23 PROCEDURE — 2580000003 HC RX 258: Performed by: ORTHOPAEDIC SURGERY

## 2023-02-23 PROCEDURE — 6370000000 HC RX 637 (ALT 250 FOR IP): Performed by: ANESTHESIOLOGY

## 2023-02-23 PROCEDURE — 3700000001 HC ADD 15 MINUTES (ANESTHESIA): Performed by: ORTHOPAEDIC SURGERY

## 2023-02-23 PROCEDURE — 7100000001 HC PACU RECOVERY - ADDTL 15 MIN: Performed by: ORTHOPAEDIC SURGERY

## 2023-02-23 PROCEDURE — 2720000010 HC SURG SUPPLY STERILE: Performed by: ORTHOPAEDIC SURGERY

## 2023-02-23 PROCEDURE — 3600000014 HC SURGERY LEVEL 4 ADDTL 15MIN: Performed by: ORTHOPAEDIC SURGERY

## 2023-02-23 PROCEDURE — 2500000003 HC RX 250 WO HCPCS: Performed by: NURSE ANESTHETIST, CERTIFIED REGISTERED

## 2023-02-23 PROCEDURE — 6360000002 HC RX W HCPCS: Performed by: NURSE ANESTHETIST, CERTIFIED REGISTERED

## 2023-02-23 PROCEDURE — 7100000000 HC PACU RECOVERY - FIRST 15 MIN: Performed by: ORTHOPAEDIC SURGERY

## 2023-02-23 PROCEDURE — 7100000011 HC PHASE II RECOVERY - ADDTL 15 MIN: Performed by: ORTHOPAEDIC SURGERY

## 2023-02-23 PROCEDURE — 2709999900 HC NON-CHARGEABLE SUPPLY: Performed by: ORTHOPAEDIC SURGERY

## 2023-02-23 RX ORDER — ROPIVACAINE HYDROCHLORIDE 5 MG/ML
INJECTION, SOLUTION EPIDURAL; INFILTRATION; PERINEURAL PRN
Status: DISCONTINUED | OUTPATIENT
Start: 2023-02-23 | End: 2023-02-23 | Stop reason: ALTCHOICE

## 2023-02-23 RX ORDER — SODIUM CHLORIDE 0.9 % (FLUSH) 0.9 %
5-40 SYRINGE (ML) INJECTION PRN
Status: DISCONTINUED | OUTPATIENT
Start: 2023-02-23 | End: 2023-02-23 | Stop reason: HOSPADM

## 2023-02-23 RX ORDER — SODIUM CHLORIDE 9 MG/ML
INJECTION, SOLUTION INTRAVENOUS PRN
Status: DISCONTINUED | OUTPATIENT
Start: 2023-02-23 | End: 2023-02-23 | Stop reason: HOSPADM

## 2023-02-23 RX ORDER — HYDROMORPHONE HCL 110MG/55ML
PATIENT CONTROLLED ANALGESIA SYRINGE INTRAVENOUS PRN
Status: DISCONTINUED | OUTPATIENT
Start: 2023-02-23 | End: 2023-02-23 | Stop reason: SDUPTHER

## 2023-02-23 RX ORDER — FENTANYL CITRATE 50 UG/ML
25 INJECTION, SOLUTION INTRAMUSCULAR; INTRAVENOUS EVERY 5 MIN PRN
Status: DISCONTINUED | OUTPATIENT
Start: 2023-02-23 | End: 2023-02-23 | Stop reason: HOSPADM

## 2023-02-23 RX ORDER — OXYCODONE HYDROCHLORIDE 5 MG/1
5 TABLET ORAL PRN
Status: COMPLETED | OUTPATIENT
Start: 2023-02-23 | End: 2023-02-23

## 2023-02-23 RX ORDER — SODIUM CHLORIDE, SODIUM LACTATE, POTASSIUM CHLORIDE, CALCIUM CHLORIDE 600; 310; 30; 20 MG/100ML; MG/100ML; MG/100ML; MG/100ML
INJECTION, SOLUTION INTRAVENOUS CONTINUOUS
Status: DISCONTINUED | OUTPATIENT
Start: 2023-02-23 | End: 2023-02-23 | Stop reason: HOSPADM

## 2023-02-23 RX ORDER — GLYCOPYRROLATE 0.2 MG/ML
INJECTION INTRAMUSCULAR; INTRAVENOUS PRN
Status: DISCONTINUED | OUTPATIENT
Start: 2023-02-23 | End: 2023-02-23 | Stop reason: SDUPTHER

## 2023-02-23 RX ORDER — SODIUM CHLORIDE 0.9 % (FLUSH) 0.9 %
5-40 SYRINGE (ML) INJECTION EVERY 12 HOURS SCHEDULED
Status: DISCONTINUED | OUTPATIENT
Start: 2023-02-23 | End: 2023-02-23 | Stop reason: HOSPADM

## 2023-02-23 RX ORDER — OXYCODONE HYDROCHLORIDE AND ACETAMINOPHEN 5; 325 MG/1; MG/1
1 TABLET ORAL EVERY 6 HOURS PRN
Qty: 28 TABLET | Refills: 0 | Status: SHIPPED | OUTPATIENT
Start: 2023-02-23 | End: 2023-03-02

## 2023-02-23 RX ORDER — DEXAMETHASONE SODIUM PHOSPHATE 4 MG/ML
INJECTION, SOLUTION INTRA-ARTICULAR; INTRALESIONAL; INTRAMUSCULAR; INTRAVENOUS; SOFT TISSUE PRN
Status: DISCONTINUED | OUTPATIENT
Start: 2023-02-23 | End: 2023-02-23 | Stop reason: SDUPTHER

## 2023-02-23 RX ORDER — LIDOCAINE HYDROCHLORIDE 20 MG/ML
INJECTION, SOLUTION INTRAVENOUS PRN
Status: DISCONTINUED | OUTPATIENT
Start: 2023-02-23 | End: 2023-02-23 | Stop reason: SDUPTHER

## 2023-02-23 RX ORDER — ONDANSETRON 4 MG/1
4 TABLET, ORALLY DISINTEGRATING ORAL 3 TIMES DAILY PRN
Qty: 21 TABLET | Refills: 1 | Status: SHIPPED | OUTPATIENT
Start: 2023-02-23

## 2023-02-23 RX ORDER — PROPOFOL 10 MG/ML
INJECTION, EMULSION INTRAVENOUS PRN
Status: DISCONTINUED | OUTPATIENT
Start: 2023-02-23 | End: 2023-02-23 | Stop reason: SDUPTHER

## 2023-02-23 RX ORDER — ONDANSETRON 2 MG/ML
4 INJECTION INTRAMUSCULAR; INTRAVENOUS
Status: DISCONTINUED | OUTPATIENT
Start: 2023-02-23 | End: 2023-02-23 | Stop reason: HOSPADM

## 2023-02-23 RX ORDER — ASPIRIN 325 MG
325 TABLET, DELAYED RELEASE (ENTERIC COATED) ORAL DAILY
Qty: 30 TABLET | Refills: 1 | Status: SHIPPED | OUTPATIENT
Start: 2023-02-23 | End: 2024-02-23

## 2023-02-23 RX ORDER — LABETALOL HYDROCHLORIDE 5 MG/ML
10 INJECTION, SOLUTION INTRAVENOUS
Status: DISCONTINUED | OUTPATIENT
Start: 2023-02-23 | End: 2023-02-23 | Stop reason: HOSPADM

## 2023-02-23 RX ORDER — MIDAZOLAM HYDROCHLORIDE 1 MG/ML
INJECTION INTRAMUSCULAR; INTRAVENOUS PRN
Status: DISCONTINUED | OUTPATIENT
Start: 2023-02-23 | End: 2023-02-23 | Stop reason: SDUPTHER

## 2023-02-23 RX ORDER — OXYCODONE HYDROCHLORIDE 5 MG/1
10 TABLET ORAL PRN
Status: COMPLETED | OUTPATIENT
Start: 2023-02-23 | End: 2023-02-23

## 2023-02-23 RX ORDER — ONDANSETRON 2 MG/ML
INJECTION INTRAMUSCULAR; INTRAVENOUS PRN
Status: DISCONTINUED | OUTPATIENT
Start: 2023-02-23 | End: 2023-02-23 | Stop reason: SDUPTHER

## 2023-02-23 RX ORDER — SENNA PLUS 8.6 MG/1
1 TABLET ORAL 2 TIMES DAILY
Qty: 60 TABLET | Refills: 0 | Status: SHIPPED | OUTPATIENT
Start: 2023-02-23 | End: 2023-02-23 | Stop reason: SDUPTHER

## 2023-02-23 RX ORDER — SENNA PLUS 8.6 MG/1
1 TABLET ORAL 2 TIMES DAILY
Qty: 60 TABLET | Refills: 0 | Status: SHIPPED | OUTPATIENT
Start: 2023-02-23 | End: 2024-02-23

## 2023-02-23 RX ORDER — FENTANYL CITRATE 50 UG/ML
INJECTION, SOLUTION INTRAMUSCULAR; INTRAVENOUS PRN
Status: DISCONTINUED | OUTPATIENT
Start: 2023-02-23 | End: 2023-02-23 | Stop reason: SDUPTHER

## 2023-02-23 RX ADMIN — FENTANYL CITRATE 50 MCG: 50 INJECTION, SOLUTION INTRAMUSCULAR; INTRAVENOUS at 12:23

## 2023-02-23 RX ADMIN — CEFAZOLIN 2000 MG: 2 INJECTION, POWDER, FOR SOLUTION INTRAMUSCULAR; INTRAVENOUS at 12:17

## 2023-02-23 RX ADMIN — ONDANSETRON 4 MG: 2 INJECTION INTRAMUSCULAR; INTRAVENOUS at 12:33

## 2023-02-23 RX ADMIN — DEXAMETHASONE SODIUM PHOSPHATE 4 MG: 4 INJECTION, SOLUTION INTRAMUSCULAR; INTRAVENOUS at 12:33

## 2023-02-23 RX ADMIN — GLYCOPYRROLATE 0.2 MG: 0.2 INJECTION INTRAMUSCULAR; INTRAVENOUS at 12:26

## 2023-02-23 RX ADMIN — OXYCODONE 5 MG: 5 TABLET ORAL at 14:52

## 2023-02-23 RX ADMIN — PROPOFOL 200 MG: 10 INJECTION, EMULSION INTRAVENOUS at 12:27

## 2023-02-23 RX ADMIN — SODIUM CHLORIDE, POTASSIUM CHLORIDE, SODIUM LACTATE AND CALCIUM CHLORIDE: 600; 310; 30; 20 INJECTION, SOLUTION INTRAVENOUS at 09:10

## 2023-02-23 RX ADMIN — HYDROMORPHONE HYDROCHLORIDE 0.5 MG: 2 INJECTION, SOLUTION INTRAMUSCULAR; INTRAVENOUS; SUBCUTANEOUS at 12:41

## 2023-02-23 RX ADMIN — LIDOCAINE HYDROCHLORIDE 50 MG: 20 INJECTION, SOLUTION INTRAVENOUS at 12:26

## 2023-02-23 RX ADMIN — FENTANYL CITRATE 50 MCG: 50 INJECTION, SOLUTION INTRAMUSCULAR; INTRAVENOUS at 12:26

## 2023-02-23 RX ADMIN — MIDAZOLAM HYDROCHLORIDE 2 MG: 2 INJECTION, SOLUTION INTRAMUSCULAR; INTRAVENOUS at 12:18

## 2023-02-23 ASSESSMENT — PAIN DESCRIPTION - FREQUENCY
FREQUENCY: CONTINUOUS

## 2023-02-23 ASSESSMENT — PAIN DESCRIPTION - LOCATION
LOCATION: KNEE

## 2023-02-23 ASSESSMENT — PAIN DESCRIPTION - DESCRIPTORS
DESCRIPTORS: PRESSURE;TENDER

## 2023-02-23 ASSESSMENT — PAIN DESCRIPTION - PAIN TYPE
TYPE: SURGICAL PAIN

## 2023-02-23 ASSESSMENT — PAIN SCALES - GENERAL
PAINLEVEL_OUTOF10: 5
PAINLEVEL_OUTOF10: 3
PAINLEVEL_OUTOF10: 5

## 2023-02-23 ASSESSMENT — PAIN DESCRIPTION - ORIENTATION
ORIENTATION: LEFT

## 2023-02-23 ASSESSMENT — PAIN - FUNCTIONAL ASSESSMENT: PAIN_FUNCTIONAL_ASSESSMENT: 0-10

## 2023-02-23 NOTE — ANESTHESIA POSTPROCEDURE EVALUATION
Department of Anesthesiology  Postprocedure Note    Patient: Johanna Benson  MRN: [id number]  YOB: 1995  Date of evaluation: 2/23/2023      Procedure Summary     Date: 02/23/23 Room / Location: [facility] OR [address] [place] [address]    Anesthesia Start: 2326 Anesthesia Stop: 1326    Procedure: LEFT KNEE ARTHROSCOPY, PARTIAL LATERAL MENISCECTOMY (Left) Diagnosis:       Acute lateral meniscus tear of left knee, initial encounter      (Acute lateral meniscus tear of left knee, initial encounter [P52.599N])    Surgeons:  Jordyn Tsai MD Responsible Provider: Bonnie Ritchie DO    Anesthesia Type: General ASA Status: 1          Anesthesia Type: General    Sunday Phase I: Sunday Score: 10    Sunday Phase II:        Anesthesia Post Evaluation    Patient location during evaluation: PACU  Patient participation: complete - patient participated  Level of consciousness: awake  Pain score: 0  Nausea & Vomiting: no nausea and no vomiting  Complications: no  Cardiovascular status: blood pressure returned to baseline  Respiratory status: acceptable  Hydration status: euvolemic

## 2023-02-23 NOTE — ANESTHESIA PRE PROCEDURE
Department of Anesthesiology  Preprocedure Note       Name:  Mathieu Banks   Age:  32 y.o.  :  1995                                          MRN:  [id number]         Date:  2023      Surgeon: Sheree Gonsales): Eliot Rangel MD    Procedure: Procedure(s):  LEFT KNEE ARTHROSCOPY, LATERAL MENISCUS REPAIR VERSUS EXCISION, SYNOVECTOMY    Medications prior to admission:   Prior to Admission medications    Medication Sig Start Date End Date Taking? Authorizing Provider   senna (SENOKOT) 8.6 MG tablet Take 1 tablet by mouth 2 times daily 23 Yes Cynthia Pal MD   ondansetron (ZOFRAN-ODT) 4 MG disintegrating tablet Take 1 tablet by mouth 3 times daily as needed for Nausea or Vomiting 23  Yes Cynthia Pal MD   aspirin 325 MG EC tablet Take 1 tablet by mouth daily 23 Yes Cynthia Pal MD   oxyCODONE-acetaminophen (PERCOCET) 5-325 MG per tablet Take 1 tablet by mouth every 6 hours as needed for Pain for up to 7 days. Intended supply: 7 days. Take lowest dose possible to manage pain Max Daily Amount: 4 tablets 2/23/23 3/2/23 Yes Cynthia Pal MD       Current medications:    Current Facility-Administered Medications   Medication Dose Route Frequency Provider Last Rate Last Admin    ceFAZolin (ANCEF) 2,000 mg in sodium chloride 0.9 % 50 mL IVPB (mini-bag)  2,000 mg IntraVENous Once Eliot Rangel MD        lactated ringers IV soln infusion   IntraVENous Continuous India Hurd MD           Allergies:  No Known Allergies    Problem List:  There is no problem list on file for this patient. Past Medical History:  History reviewed. No pertinent past medical history. Past Surgical History:  History reviewed. No pertinent surgical history.     Social History:    Social History     Tobacco Use    Smoking status: Never    Smokeless tobacco: Never   Substance Use Topics    Alcohol use: Yes     Comment: SOCIALLY                                Counseling given: Not Answered      Vital Signs (Current):   Vitals:    02/14/23 0930 02/23/23 0816   BP:  131/76   Pulse:  63   Resp:  14   Temp:  97.9 °F (36.6 °C)   TempSrc:  Temporal   SpO2:  99%   Weight: 233 lb (105.7 kg) 222 lb 3.2 oz (100.8 kg)   Height: 6' 3\" (1.905 m) 6' 3\" (1.905 m)                                              BP Readings from Last 3 Encounters:   02/23/23 131/76   02/07/23 128/62       NPO Status:                                                                                 BMI:   Wt Readings from Last 3 Encounters:   02/23/23 222 lb 3.2 oz (100.8 kg)   02/15/23 233 lb (105.7 kg)   02/07/23 233 lb (105.7 kg)     Body mass index is 27.77 kg/m². CBC:   Lab Results   Component Value Date/Time    WBC 5.5 02/09/2023 09:16 AM    RBC 4.96 02/09/2023 09:16 AM    HGB 13.0 02/09/2023 09:16 AM    HCT 41.1 02/09/2023 09:16 AM    MCV 82.8 02/09/2023 09:16 AM    RDW 14.4 02/09/2023 09:16 AM     02/09/2023 09:16 AM       CMP:   Lab Results   Component Value Date/Time     02/09/2023 09:16 AM    K 3.9 02/09/2023 09:16 AM     02/09/2023 09:16 AM    CO2 27 02/09/2023 09:16 AM    BUN 14 02/09/2023 09:16 AM    CREATININE 1.3 02/09/2023 09:16 AM    AGRATIO 1.7 02/09/2023 09:16 AM    LABGLOM >60 02/09/2023 09:16 AM    GLUCOSE 127 02/09/2023 09:16 AM    PROT 7.3 02/09/2023 09:16 AM    CALCIUM 9.3 02/09/2023 09:16 AM    BILITOT <0.2 02/09/2023 09:16 AM    ALKPHOS 104 02/09/2023 09:16 AM    AST 42 02/09/2023 09:16 AM    ALT 34 02/09/2023 09:16 AM       POC Tests: No results for input(s): POCGLU, POCNA, POCK, POCCL, POCBUN, POCHEMO, POCHCT in the last 72 hours.     Coags: No results found for: PROTIME, INR, APTT    HCG (If Applicable): No results found for: PREGTESTUR, PREGSERUM, HCG, HCGQUANT     ABGs: No results found for: PHART, PO2ART, TSC8PUH, SQO5MNT, BEART, H6XBKMTH     Type & Screen (If Applicable):  No results found for: LABABO, LABRH    Drug/Infectious Status (If Applicable):  No results found for: HIV, HEPCAB    COVID-19 Screening (If Applicable): No results found for: COVID19        Anesthesia Evaluation  Patient summary reviewed and Nursing notes reviewed no history of anesthetic complications:   Airway: Mallampati: I  TM distance: >3 FB   Neck ROM: full  Mouth opening: > = 3 FB   Dental: normal exam         Pulmonary:Negative Pulmonary ROS breath sounds clear to auscultation                             Cardiovascular:Negative CV ROS            Rhythm: regular  Rate: normal                    Neuro/Psych:   Negative Neuro/Psych ROS              GI/Hepatic/Renal: Neg GI/Hepatic/Renal ROS       (-) hiatal hernia and GERD       Endo/Other: Negative Endo/Other ROS                    Abdominal:             Vascular: Other Findings:           Anesthesia Plan      general     ASA 1       Induction: intravenous. MIPS: Prophylactic antiemetics administered. Anesthetic plan and risks discussed with patient. Plan discussed with CRNA.                     Landy Del Castillo DO   2/23/2023

## 2023-02-23 NOTE — INTERVAL H&P NOTE
Update History & Physical    The patient's History and Physical of February 7, 2023 was reviewed with the patient and I examined the patient. There was no change. The surgical site was confirmed by the patient and me. Plan: The risks, benefits, expected outcome, and alternative to the recommended procedure have been discussed with the patient. Patient understands and wants to proceed with the procedure.      Electronically signed by Jessenia Nash MD on 2/23/2023 at 8:25 AM

## 2023-02-23 NOTE — BRIEF OP NOTE
Brief Postoperative Note      Patient: Alessandro Cameron  YOB: 1995  MRN: [id number]    Date of Procedure: 2/23/2023    Pre-Op Diagnosis: Acute lateral meniscus tear of left knee, initial encounter [S83.282A]    Post-Op Diagnosis: Same       Procedure(s):  LEFT KNEE ARTHROSCOPY, LATERAL MENISCUS REPAIR VERSUS EXCISION, SYNOVECTOMY    Surgeon(s): Gudelia De Los Santos MD    Assistant:  Surgical Assistant: Modesto Lerner  Fellow: Juanjo Barth MD    Anesthesia: General    Estimated Blood Loss (mL): Minimal    Complications: None    Specimens:   * No specimens in log *    Implants:  * No implants in log *      Drains: * No LDAs found *    Findings: See dictated operative report.      Electronically signed by Juanjo Barth MD on 2/23/2023 at 1:04 PM

## 2023-02-23 NOTE — PROGRESS NOTES
PACU Transfer to [facility]    Vitals:    02/23/23 1415   BP: 129/68   Pulse: 64   Resp: 13   Temp: 97.2 °F (36.2 °C)   SpO2: 100%         Intake/Output Summary (Last 24 hours) at 2/23/2023 1429  Last data filed at 2/23/2023 1415  Gross per 24 hour   Intake 1690 ml   Output --   Net 1690 ml       Pain assessment:    Pain Level: 0    Patient transferred to care of [facility] RN.    2/23/2023 2:29 PM

## 2023-02-23 NOTE — DISCHARGE INSTRUCTIONS
Lucita Leahy MD     KNEE POST-OPERATIVE INSTRUCTIONS    DRESSING/WOUND CARE    Your incisions have been closed with absorbable sutures which will not need to be removed. In addition, they have been covered with Dermabond, which will shield them from water. You will be instructed at your first postoperative visit when you may shower. Your dressing includes gauze sponges immediately adjacent to the skin which are held in place with a layer of sterile cotton padding. Your leg has also been wrapped in an Ace bandage to provide compression and help to prevent swelling. Your dressing will be removed at your first physical therapy visit. You may remove and rewrap the Ace bandage if it feels uncomfortable or too tight. Some bleeding may be on the dressing after surgery. Call if the stain increases to more than 2 inches in diameter. KNEE IMMOBILIZER    Depending on your surgery, you may also have an immobilizer brace on your leg. This is primarily for your comfort. The straps may be loosened, but the brace must be worn when you are sleeping or walking. It may be removed by your physical therapist when you have good     CRUTCHES/JOINT MOVEMENT     Weight-bear as tolerated until you can walk without a limp and You are encouraged to move your knee as much as is comfortable    STRAIGHT LEG RAISES    Perform 10 times every 30 minutes while awake. When you begin these exercises, it is normal to feel pain in the front of your knee. You are not causing any damage to the joint by doing these exercises. He will avoid losing muscle mass in your thigh and will hasten your recovery. The more straight leg raises you can perform, the easier and more comfortable they will be. [facility] COLD THERAPY UNIT/ICE PACK    Many patients have found that the use of the controlled cold therapy system offers improved recovery and rehab following surgery.   The devices used immediately after surgery to reduce pain and swelling. Most patients use the unit for several weeks following surgery. It can be used in conjunction with physical therapy, work, and exercising. The instructions are included with the device. You may use it 20 minutes out of every hour while you are awake. Remove if it is uncomfortable. You may apply ice packs to the shoulder area 20 minutes out of every hour while you are awake if not using the iceman unit. Remove if it is uncomfortable. NERVE BLOCK    At the hospital, prior to your procedure, you have the option of receiving a nerve block of the knee. This would then make your knee numb for up to 1 day and it may take a few days for your full sensation to return. ASPIRIN-325 MG-START AFTER SURGERY  Take 1 aspirin daily until you are able to walk normally. Aspirin is a mild blood thinner and is given to help prevent blood clots following surgery  ** DO NOT TAKE ASPIRIN IF YOU HAVE AN ALLERGY TO THIS MEDICATION OR IF YOU HAVE A HISTORY OF ANY BLEEDING DISORDER**    DANIEL HOSE    White stockings have been provided for you to wear on the on operative leg to maintain blood flow and help avoid blood clots. You may discontinue use of the stockings when you are walking normally on the uninjured side. After the Ace bandage and dressing has been removed from your operated side, you can substitute the stocking for the Ace bandage when you are up and walking    BATHING    Keep your dressing dry. You may shower 5 days after your surgery. Avoid scrubbing incisions for 2 full weeks following her surgery. PAIN MEDICATION     Percocet 5/325m-2 tablets every 4-6 hours as needed for pain, Zofran 4m tablet every 8 hours as needed for nausea, and Senna 8.6m tablet every 12 hours as needed for constipation    You may take Motrin Advil or ibuprofen 1 to 2 tablets every 4-6 hours in addition to the above medication as needed.   ** IF YOU ARE TAKING TORADOL, DO NOT TAKE MOTRIN, ADVIL, OR IBUPROFEN**  **DO NOT TAKE EXTRA TYLENOL WHILE TAKING PERCOCET, [address], OR VICODIN. THESE MEDICATIONS ALSO CONTAIN TYLENOL. TAKING ADDITIONAL TYLENOL CAN CAUSE LIVER DAMAGE**    The pain medication may make you drowsy. Do not drink alcohol while taking pain medication. Do not operate a motor vehicle while taking pain medication. Nausea is a common side effect of narcotic pain medication. This can sometimes be helped by taking pain medication with food. You should drink plenty of water while taking pain medication. You should decrease the amount of medication you are taking each day following surgery. If your pain is not controlled by your pain medications or increases in severity after the first postoperative visit, please contact our office. CALL THE OFFICE IF ANY OF THE FOLLOWING OCCUR    TEMPERATURE ABOVE 101  DEVELOPMENT OF NEW NUMBNESS OR TINGLING  UNCONTROLLED NAUSEA OR VOMITING  EXCESSIVE BLEEDING  INCREASED PAIN  INABILITY TO URINATE    Loann Ching Dr. Parris Goltz, He Maddox PA-C, and a Surgical Fellow are the providers that will be involved in your care. If you have any questions, comments, or concerns please contact Ana Rosa, our , by calling [phone], or by emailing her at [email or web address]. [facility]    There are potential side effects of anesthesia or sedation you may experience for the first 24 hours. These side effects include:    Confusion or Memory loss, Dizziness, or Delayed Reaction Times   [x]A responsible person should be with you for the next 24 hours. Do not operate any vehicles (automobiles, bicycles, motorcycles) or power tools or machinery for 24 hours. Do not sign any legal documents or make any legal decisions for 24 hours. Do not drink alcohol for 24 hours or while taking narcotic pain medication. Nausea    [x]Start with light diet and progress to your normal diet as you feel like eating.  However, if you experience nausea or repeated episodes of vomiting which persist beyond 12-24 hours, notify your physician. Once nausea has passed, remember to keep drinking fluids. Difficulty Passing Urine  [x]Drink extra amounts of fluid today. Notify your physician if you have not urinated within 8 hours after your procedure or you feel uncomfortable. Irritated Throat from a Breathing Tube  [x]Drink extra amounts of fluid today. Lozenges may help. Muscle Aches  [x]You may experience some generalized body aches as your muscles recover from medications used to relax them during surgery. These will gradually subside. MEDICATION INSTRUCTIONS:  [x]Prescription(S) x  4  sent with you. Use as directed. When taking pain medications, you may experience the side effect of dizziness or drowsiness. Do not drink alcohol or drive when taking these medications. []Prescription(S) x          Called to Pharmacy Name and location:    [x]Give the list of your medications to your primary care physician on your next visit. Keep your med list updated and carry it with in case of emergencies. [x] Narcotic pain medications can cause the side effect of significant constipation. You may want to add a stool softener to your postoperative medication schedule or speak to your surgeon on how best to manage this side effect. NARCOTIC SAFETY:  Your pain medicine is only for you to take. Safely store your medicines. Store pills up high and out of reach of children and pets. Ensure safety caps are snapped tightly  Keep track of how many pills you have left    Unused medication can be disposed of by taking them to a drop-off box or take-back program that is authorized by the [facility]. Access to a site near you can be found on the [facility] website ([address]. [facility].gov).     If you have a CPAP machine, it is very important that you use it daily during all periods of sleep and daytime rest during your recovery at home. Surgery and Anesthesia place a significant amount of stress on your body. Using your CPAP will help keep you safe and lessen the negative effects of that stress. FOLLOW-UP RECOVERY CARE:  [x]Call the office at [phone] for follow-up appointment and problems    Watch for these possible complications, symptoms, or side effects of anesthesia. Call physician if they or any other problems occur:  Signs of INFECTION   > Fever over 101°     > Redness, swelling, hardness or warmth at the operative site   >Foul smelling or cloudy drainage at the operative site   Unrelieved PAIN  Unrelieved NAUSEA  Blood soaked dressing. (Some oozing may be normal)  Inability to urinate      Numb, pale, blue, cold or tingling extremity      Physician:  Dr. Akbar Coffman    The above instructions were reviewed with patient/significant other. The following additional patient specific information was reviewed with the patient/significant other:  [x]Procedure/physician specific instructions  []Medication information sheet(S) including potential side effects  []Reginas egress test  []Pain Ball management  []FAQ Catheter associated blood stream infections  []FAQ Surgical Site Infections  []Other-    I have read and understand the instructions given to me: ____________________________________________   (Patient/S.O. Signature)            Date/time 2/23/2023 2:00 PM         PACU:  [phone]   M-F 700 AM - 7 PM      SAME DAY SERVICES:  [phone] M-F 7AM-6PM        If you smoke STOP. We care about your health!

## 2023-02-23 NOTE — PROGRESS NOTES
From OR sedated with oral airway in place, dressing with ace wrap CDI, ice pack applied, VSS. Report from [facility] and [facility].     S/P LEFT KNEE ARTHROSCOPY, PARTIAL LATERAL MENISCECTOMY

## 2023-02-23 NOTE — PROGRESS NOTES
Ambulatory Surgery/Procedure Discharge Note    Vitals:    02/23/23 1430   BP: 124/72   Pulse: 60   Resp: 16   Temp: 97.2 °F (36.2 °C)   SpO2: 100%       In: 2170 [P.O.:630; I.V.:1490]  Out: -     Restroom use offered before discharge. Yes    Pain assessment:  level of pain (1-10, 10 severe),   Pain Level: 3     Pt alert and oriented x4. IV removed per protocol. Denies N/V or pain. Drsg to LLE C, D, & I with <3sec cap refill and +2palp pedal pulse elevated with ice pack in place. Voided prior to discharge. Discharge instructions given to pt and S.O./Family with pt permission. Pt and S.O./Family verbalized understanding of all instructions. Left with all belongings, prescriptions, and discharge instructions. Patient discharged to home/self care.  Patient discharged via wheel chair by transporter to waiting family/S.O.       2/23/2023 3:47 PM

## 2023-02-23 NOTE — PROGRESS NOTES
Had ice chips and tolerated well, now eating applesauce and drinking ginger ale and tolerating well.

## 2023-02-24 ENCOUNTER — HOSPITAL ENCOUNTER (OUTPATIENT)
Dept: PHYSICAL THERAPY | Age: 28
Setting detail: THERAPIES SERIES
Discharge: HOME | End: 2023-02-24
Payer: COMMERCIAL

## 2023-02-24 ENCOUNTER — OFFICE VISIT (OUTPATIENT)
Dept: ORTHOPEDIC SURGERY | Age: 28
End: 2023-02-24

## 2023-02-24 VITALS — BODY MASS INDEX: 27.35 KG/M2 | HEIGHT: 75 IN | WEIGHT: 220 LBS

## 2023-02-24 DIAGNOSIS — Z98.890 S/P LATERAL MENISCECTOMY OF LEFT KNEE: ICD-10-CM

## 2023-02-24 DIAGNOSIS — S83.282A TEAR OF LATERAL MENISCUS OF LEFT KNEE, UNSPECIFIED TEAR TYPE, UNSPECIFIED WHETHER OLD OR CURRENT TEAR, INITIAL ENCOUNTER: Primary | ICD-10-CM

## 2023-02-24 PROCEDURE — 97112 NEUROMUSCULAR REEDUCATION: CPT

## 2023-02-24 PROCEDURE — 97161 PT EVAL LOW COMPLEX 20 MIN: CPT

## 2023-02-24 PROCEDURE — 97110 THERAPEUTIC EXERCISES: CPT

## 2023-02-24 NOTE — FLOWSHEET NOTE
The [address] [place], [place] [address], [address] [place] [address]  Phone: [phone]   Fax:     [phone]    Physical Therapy Daily Treatment Note  Date:  2023    Patient Name:  Rodrigo Mata    :  1995  MRN: [id number]  Restrictions/Precautions:    Medical/Treatment Diagnosis Information:  Diagnosis: S83.282A (ICD-10-CM) - Tear of lateral meniscus of left knee, unspecified tear type, unspecified whether old or current tear, initial encounter; Z98.890 (ICD-10-CM) - S/P lateral meniscectomy of left knee  Treatment Diagnosis: M25.562 Left Knee Pain  Insurance/Certification information:  PT Insurance Information: [facility]  Physician Information:   Dandy Flores MD  Has the plan of care been signed (Y/N):        []  Yes  [x]  No     Date of Patient follow up with Physician:       Is this a Progress Report:     []  Yes  [x]  No        If Yes:  Date Range for reporting period:  Beginning 23  Ending23    Progress report will be due (10 Rx or 30 days whichever is less):        Recertification will be due (POC Duration  / 90 days whichever is less): 23         Visit # Insurance Allowable Auth Required   1   60 []  Yes []  No        Functional Scale: LEFS: 72.5% deficit    Date assessed:         Latex Allergy:  [x]NO      []YES  Preferred Language for Healthcare:   [x]English       []other:    Pain level:  0-3/10     SUBJECTIVE:  See eval      OBJECTIVE:   Flexibility   L R Comment   Hamstring Moderate restriction     Gastroc Mild restriction     ITB      Quad              ROM  PROM AROM Overpressure Comment    L R L R L R    Flexion   74 WNL      Extension   0 0                              Strength   L R Comment   Quad Fair contraction     Hamstring      Gastroc      Hip  flexion      Hip abd                      Special Test   Results/Comment   Meniscal Click    Crepitus    Flexion Test Valgus Laxity    Varus Laxity    Lachmans    Drop Back    Homans negative           Girth  2/24 L R   Mid Patella 44 cm 43 cm   Suprapatellar 44.5 cm 42.8 cm   5cm above 44.5 cm 45 cm   15cm above       Reflexes/Sensation: 2/24   [x]Dermatomes/Myotomes intact    []Reflexes equal and normal bilaterally   []Other:    Joint mobility: 2/24    [x]Normal    []Hypo   []Hyper    Palpation: Denies TTP 2/24    Functional Mobility/Transfers: Modified independent with increase pain and use of bilateral crutches; unable to ascend/descend step with a normalized reciprocal gait pattern; unable to ambulate distances linger than a few feet; unable to squat; unable to participate in recreational and sporting activities  2/24    Posture: WNL 2/24    Bandages/Dressings/Incisions: glue applied over incisions; no signs or symptoms of infection  2/24    Gait: (include devices/WB status) WBAT with use of bilateral cruthces  2/24                       [x] Patient history, allergies, meds reviewed. Medical chart reviewed. See intake form. 2/24    Review Of Systems (ROS):  [x]Performed Review of systems (Integumentary, CardioPulmonary, Neurological) by intake and observation. Intake form has been scanned into medical record. Patient has been instructed to contact their primary care physician regarding ROS issues if not already being addressed at this time.   2/24    RESTRICTIONS/PRECAUTIONS:  L LME 2/23/23    Exercises/Interventions:   Exercise/Equipment Resistance/Repetitions Other comments   Stretching     Hamstring 30 sec x 5 Start 2/24   Towel Pull 30 sec x 5 Start 2/24   Inclined Calf     Hip Flexion     ITB     Groin     Quad                    SLR     Supine 3 x 10 Start 2/24   Abduction 3 x 10 Start 2/24   Adduction     Prone     SLR+          Isometrics     Quad sets 10 sec x 10 Start 2/24   Adduction sets 10 sec x 10 Start 2/24        Patellar Glides     Medial     Superior     Inferior          ROM     Sheet Pulls 10 sec x 10 Start 2/24   Hang Weights     Passive     Active     Weight Shift     Ankle Pumps 30x Start 2/24                  CKC     Calf raises     Wall sits     Step ups     1 leg stand     Squatting     CC TKE     Balance     bridges          PRE     Extension  RANGE:   Flexion  RANGE:        Quantum machines     Leg press      Leg extension     Leg curl          Manual interventions                     Therapeutic Exercise and NMR EXR  [x] (02362) Provided verbal/tactile cueing for activities related to strengthening, flexibility, endurance, ROM for improvements in LE, proximal hip, and core control with self care, mobility, lifting, ambulation. [x] (08395) Provided verbal/tactile cueing for activities related to improving balance, coordination, kinesthetic sense, posture, motor skill, proprioception  to assist with LE, proximal hip, and core control in self care, mobility, lifting, ambulation and eccentric single leg control.      NMR and Therapeutic Activities:    [x] (81592 or 26512) Provided verbal/tactile cueing for activities related to improving balance, coordination, kinesthetic sense, posture, motor skill, proprioception and motor activation to allow for proper function of core, proximal hip and LE with self care and ADLs  [] (14873) Gait Re-education- Provided training and instruction to the patient for proper LE, core and proximal hip recruitment and positioning and eccentric body weight control with ambulation re-education including up and down stairs     Home Exercise Program:    [x] (83679) Reviewed/Progressed HEP activities related to strengthening, flexibility, endurance, ROM of core, proximal hip and LE for functional self-care, mobility, lifting and ambulation/stair navigation   [] (63037)Reviewed/Progressed HEP activities related to improving balance, coordination, kinesthetic sense, posture, motor skill, proprioception of core, proximal hip and LE for self care, mobility, lifting, and ambulation/stair navigation      Manual Treatments:  PROM / STM / Oscillations-Mobs:  G-I, II, III, IV (PA's, Inf., Post.)  [] (72938) Provided manual therapy to mobilize LE, proximal hip and/or LS spine soft tissue/joints for the purpose of modulating pain, promoting relaxation,  increasing ROM, reducing/eliminating soft tissue swelling/inflammation/restriction, improving soft tissue extensibility and allowing for proper ROM for normal function with self care, mobility, lifting and ambulation. Modalities:  CP 10min  2/24    Charges:  Timed Code Treatment Minutes: 30 + EVAL   Total Treatment Minutes: 60       [x] EVAL (LOW) 56139 (typically 20 minutes face-to-face)  [] EVAL (MOD) 47902 (typically 30 minutes face-to-face)  [] EVAL (HIGH) 91678 (typically 45 minutes face-to-face)  [] RE-EVAL   [x] IB(14121) x 1    [] IONTO  [x] NMR (65031) x 1    [] VASO  [] Manual (12736) x      [] Other:  [] TA x      [] Mech Traction (66238)  [] ES(attended) (34945)      [] ES (un) (15624):     GOALS:   Patient stated goal: Return to full activity; Playing basketbal    [] Progressing: [] Met: [] Not Met: [] Adjusted    Therapist goals for Patient:   Short Term Goals: To be achieved in: 2 weeks  1. Independent in HEP and progression per patient tolerance, in order to prevent re-injury. [] Progressing: [] Met: [] Not Met: [] Adjusted   2. Patient will have a decrease in pain to facilitate improvement in movement, function, and ADLs as indicated by Functional Deficits. [] Progressing: [] Met: [] Not Met: [] Adjusted    Long Term Goals: To be achieved in: 8 weeks  1. Disability index score of 25% or less for the LEFS to assist with reaching prior level of function. [] Progressing: [] Met: [] Not Met: [] Adjusted  2. Patient will demonstrate increased AROM to WNL to allow for proper joint functioning as indicated by patients Functional Deficits. [] Progressing: [] Met: [] Not Met: [] Adjusted  3.  Patient will demonstrate an increase in Strength to good proximal hip strength and control  in LE to allow for proper functional mobility as indicated by patients Functional Deficits. [] Progressing: [] Met: [] Not Met: [] Adjusted  4. Patient will return to Independent functional activities without increased symptoms or restriction. [] Progressing: [] Met: [] Not Met: [] Adjusted  5. Patient will report returning to playing basketball and full gym program in 12 weeks. [] Progressing: [] Met: [] Not Met: [] Adjusted     Overall Progression Towards Functional goals/ Treatment Progress Update:  [] Patient is progressing as expected towards functional goals listed. [] Progression is slowed due to complexities/Impairments listed. [] Progression has been slowed due to co-morbidities. [x] Plan just implemented, too soon to assess goals progression <30days   [] Goals require adjustment due to lack of progress  [] Patient is not progressing as expected and requires additional follow up with physician  [] Other    Prognosis for POC: [x] Good [] Fair  [] Poor      Patient requires continued skilled intervention: [x] Yes  [] No    Treatment/Activity Tolerance:  [x] Patient able to complete treatment  [] Patient limited by fatigue  [] Patient limited by pain     [] Patient limited by other medical complications  [] Other:     Patient Education:                  PLAN: See eval  [] Continue per plan of care [] Alter current plan (see comments above)  [x] Plan of care initiated [] Hold pending MD visit [] Discharge      Electronically signed by:  Aaron Herman, PT, DPT    Note: If patient does not return for scheduled/ recommended follow up visits, this note will serve as a discharge from care along with most recent update on progress.

## 2023-02-24 NOTE — OP NOTE
[address], [address]                                OPERATIVE REPORT    PATIENT NAME: Tramaine Isaacs                    :        1995  MED REC NO:   [id number]                          ROOM:  ACCOUNT NO:   [de-identified]                           ADMIT DATE: 2023  PROVIDER:     Shilpa Araiza MD    DATE OF PROCEDURE:  2023    OPERATION:  Left knee arthroscopy, partial lateral meniscectomy, and  synovectomy. SURGEON:  Shilpa Araiza MD    ASSISTANT:  Rc Hull MD    ANESTHESIA:  General.    PREOPERATIVE DIAGNOSIS:  Torn lateral meniscus tear. POSTOPERATIVE DIAGNOSIS:  Torn lateral meniscus tear. PREPARATION:  Chloraprep. INDICATIONS:  This is a 58-year-old gentleman who has one-year history  of anterolateral knee pain associated with twisting, turning movements  and MRI evidence of anterior horn, anterior root lateral meniscus tear. He presents for arthroscopic evaluation and treatment. Risks and  benefits of surgery as well as nonsurgical alternatives were discussed  in detail with the patient who understood and consented to the  operation. DESCRIPTION OF PROCEDURE:  I saw the patient in the holding area,  confirmed that the left knee was the operative extremity. We initialed  the operative site. He was taken to the OR and after induction of  general anesthesia, a tourniquet was placed on the left thigh. The left  leg was prepped and draped in a sterile fashion. A time-out was  performed. The OR team agreed that the left knee was the operative  side. The leg was exsanguinated with an Esmarch bandage. Tourniquet  was inflated to 250 mmHg. Incision was made. The scope was placed in  the joint. The knee was visualized sequentially. All articular  cartilage surfaces were well preserved. Medial meniscus was normal.   ACL was intact.   Synovitis was present anterior and anterolaterally. Synovectomy was carried out using synovial shaver. There was tearing of  the inner border and anterior aspect of the root of the lateral  meniscus. The remaining root, approximately 50%, remained intact and  the meniscus was stable and could not be pulled out of the joint. The  degenerative tear present at the anterior horn-root junction was  debrided using synovial shaver back to a stable base of tissue. There  was some evidence of ganglion cyst present at the inferior aspect of the  root. This was opened up using a probe. Synovectomy was carried out  over the anterior aspect of the lateral meniscus. The remaining  meniscus was probed and, again, a small area of fraying was noted at the  posterior root attachment. This was debrided with a synovial shaver. The scope was removed from the joint. The incision was closed with  Monocryl suture. Sterile dressing was applied. The patient was  awakened and taken to the recovery room in stable condition. Sponge and  needle counts were correct at the conclusion of the procedure.   Blood  loss was minimal.        Vannessa Powell MD    D: 02/23/2023 13:28:24       T: 02/23/2023 22:41:32     [email or web address]  Job#: [id number]     Doc#: [id number]    CC:

## 2023-02-24 NOTE — PLAN OF CARE
The [address] [place] [address]  Phone [phone]  Fax [phone]                                                       Physical Therapy Certification    Dear  ,    We had the pleasure of evaluating the following patient for physical therapy services at [address] [place] [address]. A summary of our findings can be found in the initial assessment below. This includes our plan of care. If you have any questions or concerns regarding these findings, please do not hesitate to contact me at the office phone number checked above.   Thank you for the referral.       Physician Signature:_______________________________Date:__________________  By signing above (or electronic signature), therapists plan is approved by physician      Patient: Siomara Estrella   : 1995   MRN: [id number]  Referring Physician:  Lay Easton MD      Evaluation Date: 2023      Medical Diagnosis Information:  Diagnosis: F20.317D (ICD-10-CM) - Tear of lateral meniscus of left knee, unspecified tear type, unspecified whether old or current tear, initial encounter; Z98.890 (ICD-10-CM) - S/P lateral meniscectomy of left knee   Treatment Diagnosis: M25.562 Left Knee Pain                                         Insurance information: PT Insurance Information: [facility]     Precautions/ Contra-indications:     C-SSRS Triggered by Intake questionnaire (Past 2 wk assessment):   [x] No, Questionnaire did not trigger screening.   [] Yes, Patient intake triggered further evaluation      [] C-SSRS Screening completed  [] PCP notified via Plan of Care  [] Emergency services notified     Latex Allergy:  [x]NO      []YES  Preferred Language for Healthcare:   [x]English       []other:    SUBJECTIVE: Patient stated complaint: Patient is a 32year old male who presents to the clinic with status post L knee arthroscopy and lateral meniscus excision 2/23/23. OPERATION:  Left knee arthroscopy, partial lateral meniscectomy, and synovectomy.     Relevant Medical History:None  Functional Disability Index: LEFS: 72.5% deficit    Pain Scale: 0-3/10  Easing factors: resting; icing, medication  Provocative factors: Bending     Type: []Constant   [x]Intermittent  []Radiating []Localized []other:     Numbness/Tingling: Denies    Occupation/School:     Living Status/Prior Level of Function: Independent with ADLs and IADLs,     OBJECTIVE:      Flexibility  2/24 L R Comment   Hamstring Moderate restriction     Gastroc Mild restriction     ITB      Quad              ROM 2/24 PROM AROM Overpressure Comment    L R L R L R    Flexion   74 WNL      Extension   0 0                              Strength  2/24 L R Comment   Quad Fair contraction     Hamstring      Gastroc      Hip  flexion      Hip abd                      Special Test  2/24 Results/Comment   Meniscal Click    Crepitus    Flexion Test    Valgus Laxity    Varus Laxity    Lachmans    Drop Back    Homans negative           Girth  2/24 L R   Mid Patella 44 cm 43 cm   Suprapatellar 44.5 cm 42.8 cm   5cm above 44.5 cm 45 cm   15cm above       Reflexes/Sensation: 2/24   [x]Dermatomes/Myotomes intact    []Reflexes equal and normal bilaterally   []Other:    Joint mobility: 2/24    [x]Normal    []Hypo   []Hyper    Palpation: Denies TTP 2/24    Functional Mobility/Transfers: Modified independent with increase pain and use of bilateral crutches; unable to ascend/descend step with a normalized reciprocal gait pattern; unable to ambulate distances linger than a few feet; unable to squat; unable to participate in recreational and sporting activities  2/24    Posture: WNL 2/24    Bandages/Dressings/Incisions: glue applied over incisions; no signs or symptoms of infection  2/24    Gait: (include devices/WB status) WBAT with use of bilateral cruthces  2/24                       [x] Patient history, allergies, meds reviewed. Medical chart reviewed. See intake form. 2/24    Review Of Systems (ROS):  [x]Performed Review of systems (Integumentary, CardioPulmonary, Neurological) by intake and observation. Intake form has been scanned into medical record. Patient has been instructed to contact their primary care physician regarding ROS issues if not already being addressed at this time. 2/24    Co-morbidities/Complexities (which will affect course of rehabilitation):   [x]None           Arthritic conditions   []Rheumatoid arthritis (M05.9)  []Osteoarthritis (M19.91)   Cardiovascular conditions   []Hypertension (I10)  []Hyperlipidemia (E78.5)  []Angina pectoris (I20)  []Atherosclerosis (I70)   Musculoskeletal conditions   []Disc pathology   []Congenital spine pathologies   []Prior surgical intervention  []Osteoporosis (M81.8)  []Osteopenia (M85.8)   Endocrine conditions   []Hypothyroid (E03.9)  []Hyperthyroid Gastrointestinal conditions   []Constipation (W88.52)   Metabolic conditions   []Morbid obesity (E66.01)  []Diabetes type 1(E10.65) or 2 (E11.65)   []Neuropathy (G60.9)     Pulmonary conditions   []Asthma (J45)  []Coughing   []COPD (J44.9)   Psychological Disorders  []Anxiety (F41.9)  []Depression (F32.9)   []Other:   []Other:          Barriers to/and or personal factors that will affect rehab potential:              [x]Age  [x]Sex              [x]Motivation/Lack of Motivation                        []Co-Morbidities              []Cognitive Function, education/learning barriers              []Environmental, home barriers              []profession/work barriers  [x]past PT/medical experience  []other:  Justification:     Falls Risk Assessment (30 days):   [x] Falls Risk assessed and no intervention required.   [] Falls Risk assessed and Patient requires intervention due to being higher risk   TUG score (>12s at risk):     [] Falls education provided, including       G-Codes:   LEFS: 72.5% deficit    ASSESSMENT:   Functional Impairments:     []Noted lumbar/proximal hip/LE hypomobility   [x]Decreased LE functional ROM   [x]Decreased core/proximal hip strength and neuromuscular control   [x]Decreased LE functional strength   [x]Reduced balance/proprioceptive control   []other:      Functional Activity Limitations (from functional questionnaire and intake)   [x]Reduced ability to tolerate prolonged functional positions   [x]Reduced ability or difficulty with changes of positions or transfers between positions   [x]Reduced ability to maintain good posture and demonstrate good body mechanics with sitting, bending, and lifting   [x]Reduced ability to sleep   [x] Reduced ability or tolerance with driving and/or computer work   [x]Reduced ability to perform lifting, carrying tasks   [x]Reduced ability to squat   [x]Reduced ability to forward bend   [x]Reduced ability to ambulate prolonged functional periods/distances/surfaces   [x]Reduced ability to ascend/descend stairs   [x]Reduced ability to run, hop or jump   []other:     Participation Restrictions   [x]Reduced participation in self care activities   [x]Reduced participation in home management activities   [x]Reduced participation in work activities   [x]Reduced participation in social activities. []Reduced participation in sport activities. Classification :    [x]Signs/symptoms consistent with post-surgical status including decreased ROM, strength and function.    []Signs/symptoms consistent with joint sprain/strain   []Signs/symptoms consistent with patella-femoral syndrome   []Signs/symptoms consistent with knee OA/hip OA   []Signs/symptoms consistent with internal derangement of knee/Hip   []Signs/symptoms consistent with functional hip weakness/NMR control      []Signs/symptoms consistent with tendinitis/tendinosis    []signs/symptoms consistent with pathology which may benefit from Dry needling      []other:      Prognosis/Rehab Potential: []Excellent   [x]Good    []Fair   []Poor    Tolerance of evaluation/treatment:    []Excellent   [x]Good    []Fair   []Poor    Physical Therapy Evaluation Complexity Justification  [x] A history of present problem with:  [x] no personal factors and/or comorbidities that impact the plan of care;  []1-2 personal factors and/or comorbidities that impact the plan of care  []3 personal factors and/or comorbidities that impact the plan of care  [x] An examination of body systems using standardized tests and measures addressing any of the following: body structures and functions (impairments), activity limitations, and/or participation restrictions;:  [] a total of 1-2 or more elements   [x] a total of 3 or more elements   [] a total of 4 or more elements   [x] A clinical presentation with:  [x] stable and/or uncomplicated characteristics   [] evolving clinical presentation with changing characteristics  [] unstable and unpredictable characteristics;   [x] Clinical decision making of [x] low, [] moderate, [] high complexity using standardized patient assessment instrument and/or measurable assessment of functional outcome. [x] EVAL (LOW) 60632 (typically 20 minutes face-to-face)  [] EVAL (MOD) 97298 (typically 30 minutes face-to-face)  [] EVAL (HIGH) 70233 (typically 45 minutes face-to-face)  [] RE-EVAL       PLAN  Frequency/Duration:  1-2 days per week for 8 Weeks:  Interventions:  [x]  Therapeutic exercise including: strength training, ROM, for Lower extremity and core   [x]  NMR activation and proprioception for LE, Glutes and Core   [x]  Manual therapy as indicated for LE, Hip and spine to include: Dry Needling/IASTM, STM, PROM, Gr I-IV mobilizations, manipulation. [x] Modalities as needed that may include: thermal agents, E-stim, Biofeedback, US, iontophoresis as indicated  [x] Patient education on joint protection, postural re-education, activity modification, progression of HEP.     HEP instruction:   Access Code: [email or web address]  URL: [email or web address]  Date: 02/24/2023  Prepared by: Baylee Lopez    Exercises  Long Sitting Ankle Pumps - 5-7 x daily - 7 x weekly - 3 sets - 10 reps - 1-2 hold  Long Sitting Calf Stretch with Strap - 2 x daily - 7 x weekly - 3 sets - 5 reps - 30\" hold  Seated Table Hamstring Stretch - 2 x daily - 7 x weekly - 3 sets - 5 reps - 30\" hold  Long Sitting Quad Set - 5-7 x daily - 7 x weekly - 1 sets - 10 reps - 10 hold  Supine Hip Adduction Isometric with Ball - 2 x daily - 7 x weekly - 1 sets - 10 reps - 10\" hold  Supine Straight Leg Raises - 2 x daily - 7 x weekly - 3 sets - 10 reps  Side Leg Lifts - 1 x daily - 7 x weekly - 3 sets - 5 reps - 30\" hold  Sitting Heel Slide with Towel - 2 x daily - 7 x weekly - 1 sets - 10 reps - 10\" hold    GOALS:   Patient stated goal: Return to full activity; Playing basketbal    [] Progressing: [] Met: [] Not Met: [] Adjusted    Therapist goals for Patient:   Short Term Goals: To be achieved in: 2 weeks  1. Independent in HEP and progression per patient tolerance, in order to prevent re-injury. [] Progressing: [] Met: [] Not Met: [] Adjusted   2. Patient will have a decrease in pain to facilitate improvement in movement, function, and ADLs as indicated by Functional Deficits. [] Progressing: [] Met: [] Not Met: [] Adjusted    Long Term Goals: To be achieved in: 8 weeks  1. Disability index score of 25% or less for the LEFS to assist with reaching prior level of function. [] Progressing: [] Met: [] Not Met: [] Adjusted  2. Patient will demonstrate increased AROM to WNL to allow for proper joint functioning as indicated by patients Functional Deficits. [] Progressing: [] Met: [] Not Met: [] Adjusted  3. Patient will demonstrate an increase in Strength to good proximal hip strength and control  in LE to allow for proper functional mobility as indicated by patients Functional Deficits. [] Progressing: [] Met: [] Not Met: [] Adjusted  4.  Patient will return to Independent functional activities without increased symptoms or restriction. [] Progressing: [] Met: [] Not Met: [] Adjusted  5. Patient will report returning to playing basketball and full gym program in 12 weeks. [] Progressing: [] Met: [] Not Met: [] Adjusted       Electronically signed by:  Tammie Guardado PT, DPT    Note: If patient does not return for scheduled/ recommended follow up visits, this note will serve as a discharge from care along with most recent update on progress.

## 2023-02-24 NOTE — PROGRESS NOTES
Chief Complaint  Post-Op Check (Left knee. S/p sx )      History of Present Illness:  Cornelio Rodriguez is a pleasant 32 y.o. male who presents today for follow up evaluation of left knee. He is 1 day status post left knee arthroscopy, partial lateral meniscectomy, and synovectomy on 2/23/2023. He started post operative physical therapy today with no issues. He has some tightness due to swelling but overall doing well. Denies fevers or chills. Medical History:  Patient's medications, allergies, past medical, surgical, social and family histories were reviewed and updated as appropriate. Pertinent items are noted in HPI  Review of systems reviewed from Patient History Form completed today and available in the patient's chart under the Media tab. Pain Assessment  Location of Pain: Knee  Location Modifiers: Left  Severity of Pain: 3  Quality of Pain: Aching  Duration of Pain: Persistent  Frequency of Pain: Intermittent  Limiting Behavior: Yes  Relieving Factors: Rest  Result of Injury: Yes  Work-Related Injury: No  Are there other pain locations you wish to document?: No    Past Medical History:   Diagnosis Date    Rhabdomyolysis     10-12 years ago        Past Surgical History:   Procedure Laterality Date    KNEE ARTHROSCOPY Left 2/23/2023    LEFT KNEE ARTHROSCOPY, PARTIAL LATERAL MENISCECTOMY performed by Baylee Rivas MD at [facility] reviewed. No pertinent family history.     Social History     Socioeconomic History    Marital status: Single     Spouse name: None    Number of children: 0    Years of education: None    Highest education level: None   Tobacco Use    Smoking status: Never    Smokeless tobacco: Never   Vaping Use    Vaping Use: Never used   Substance and Sexual Activity    Alcohol use: Yes     Comment: SOCIALLY    Drug use: Yes     Types: Marijuana Talell Goldberg)     Comment: COUPLE TIMES A MONTH     Social Determinants of Health     Financial Resource Strain: Low Risk     Difficulty of Paying Living Expenses: Not hard at all   Food Insecurity: No Food Insecurity    Worried About [email or web address] in the Last Year: Never true    Ran Out of Food in the Last Year: Never true   Transportation Needs: Unknown    Lack of Transportation (Non-Medical): No   Physical Activity: Sufficiently Active    Days of Exercise per Week: 4 days    Minutes of Exercise per Session: 70 min   Intimate Partner Violence: Not At Risk    Fear of Current or Ex-Partner: No    Emotionally Abused: No    Physically Abused: No    Sexually Abused: No   Housing Stability: Unknown    Unstable Housing in the Last Year: No       Current Outpatient Medications   Medication Sig Dispense Refill    ondansetron (ZOFRAN-ODT) 4 MG disintegrating tablet Take 1 tablet by mouth 3 times daily as needed for Nausea or Vomiting 21 tablet 1    aspirin 325 MG EC tablet Take 1 tablet by mouth daily 30 tablet 1    oxyCODONE-acetaminophen (PERCOCET) 5-325 MG per tablet Take 1 tablet by mouth every 6 hours as needed for Pain for up to 7 days. Intended supply: 7 days. Take lowest dose possible to manage pain Max Daily Amount: 4 tablets 28 tablet 0    senna (SENOKOT) 8.6 MG tablet Take 1 tablet by mouth 2 times daily 60 tablet 0     No current facility-administered medications for this visit. No Known Allergies    Vital signs:  Ht 6' 3\" (1.905 m)   Wt 220 lb (99.8 kg)   BMI 27.50 kg/m²            LEFT knee exam    Gait: No use of assistive devices. No antalgic gait. Alignment: normal alignment. Inspection/skin: Incisions healing well. No indication of infection. No dehiscence. No drainage. No diffuse erythema. Palpation: Non tender to light touch    Range of Motion: There is full range of motion. Strength: Normal quadriceps development. Effusion: Minimal effusion    Ligamentous stability: No gross instability. Neurologic and vascular:  Calf soft and nontender. Skin is warm and well-perfused.  Sensation is intact to light-touch. Radiology:     Pertinent imaging was interpreted and reviewed with the patient. No new imaging was obtained during today's visit. Assessment :  32year old 1 day status post left knee arthroscopy, partial lateral meniscectomy, and synovectomy on 2/23/2023    Impression:  Encounter Diagnoses   Name Primary? Tear of lateral meniscus of left knee, unspecified tear type, unspecified whether old or current tear, initial encounter Yes    S/P lateral meniscectomy of left knee        Office Procedures:  Orders Placed This Encounter   Procedures    [facility]     Referral Priority:   Routine     Referral Type:   Eval and Treat     Referral Reason:   Specialty Services Required     Requested Specialty:   Physical Therapist     Number of Visits Requested:   1    MS ARTHROCENTESIS ASPIR&/INJ MAJOR JT/BURSA W/O US         Plan: Pertinent imaging was reviewed. The etiology, natural history, and treatment options for the disorder were discussed. The roles of activity medication, antiinflammatories, injections, bracing, physical therapy, and surgical interventions were all described to the patient and questions were answered. Patient is doing well 1 day post op. He does have a fair amount of swelling we will aspirate today. He will continue post operative physical therapy per protocol. LEFT knee aspiration site was doubly prepped with Chlora-Prep using aseptic technique and an aspiration was performed with ethyl chloride & 1% lidocaine (3 ml) for anesthetic:    55 ml of blood was aspirated. Patient tolerated the treatment well and received good relief. Appropriate post aspiration care instructions were provided. I will see him back in 1 month, sooner if needed. Anabella Jesus is in agreement with this plan. All questions were answered to patient's satisfaction and was encouraged to call with any further questions.         Zev Jacobs ATC, am scribing for and in the presence of Dr. Courtney Martínez. 02/24/23 2:20 PM Shanice Del Toro ATC    I attest that I met personally with the patient, performed the described exam, reviewed the radiographic studies and medical records associated with this patient and supervised the services that are described above.      Charity Maurice MD

## 2023-02-28 ENCOUNTER — HOSPITAL ENCOUNTER (OUTPATIENT)
Dept: PHYSICAL THERAPY | Age: 28
Setting detail: THERAPIES SERIES
Discharge: HOME | End: 2023-02-28
Payer: COMMERCIAL

## 2023-02-28 PROCEDURE — 97112 NEUROMUSCULAR REEDUCATION: CPT

## 2023-02-28 PROCEDURE — 97110 THERAPEUTIC EXERCISES: CPT

## 2023-02-28 NOTE — FLOWSHEET NOTE
The [address] [place] [address] 200, [address], [address] [place] [address]  Phone: [phone]   Fax:     [phone]    Physical Therapy Daily Treatment Note  Date:  2023    Patient Name:  Sabas Fregoso    :  1995  MRN: [id number]  Restrictions/Precautions:    Medical/Treatment Diagnosis Information:  Diagnosis: C08.684Z (ICD-10-CM) - Tear of lateral meniscus of left knee, unspecified tear type, unspecified whether old or current tear, initial encounter; Z98.890 (ICD-10-CM) - S/P lateral meniscectomy of left knee  Treatment Diagnosis: M25.562 Left Knee Pain  Insurance/Certification information:  PT Insurance Information: [facility]  Physician Information:   Nash Valles MD  Has the plan of care been signed (Y/N):        []  Yes  [x]  No     Date of Patient follow up with Physician:       Is this a Progress Report:     []  Yes  [x]  No        If Yes:  Date Range for reporting period:  Beginning 23  Ending23    Progress report will be due (10 Rx or 30 days whichever is less):        Recertification will be due (POC Duration  / 90 days whichever is less): 23         Visit # Insurance Allowable Auth Required   2   60 []  Yes []  No        Functional Scale: LEFS: 72.5% deficit    Date assessed:         Latex Allergy:  [x]NO      []YES  Preferred Language for Healthcare:   [x]English       []other:    Pain level:  3/10        SUBJECTIVE:   Patient states that he is doing well. He states that he is feeling better.     OBJECTIVE:   Flexibility   L R Comment   Hamstring Moderate restriction     Gastroc Mild restriction     ITB      Quad              ROM  PROM AROM Overpressure Comment    L R L R L R    Flexion  104  WNL      Extension   0 0                              Strength   L R Comment   Quad Fair contraction     Hamstring      Gastroc      Hip  flexion      Hip abd                      Special Test 2/24 Results/Comment   Meniscal Click    Crepitus    Flexion Test    Valgus Laxity    Varus Laxity    Lachmans    Drop Back    Homans negative           Girth  2/24 L R   Mid Patella 44 cm 43 cm   Suprapatellar 44.5 cm 42.8 cm   5cm above 44.5 cm 45 cm   15cm above       Reflexes/Sensation: 2/24   [x]Dermatomes/Myotomes intact    []Reflexes equal and normal bilaterally   []Other:    Joint mobility: 2/24    [x]Normal    []Hypo   []Hyper    Palpation: Denies TTP 2/24    Functional Mobility/Transfers: Modified independent with increase pain and use of bilateral crutches; unable to ascend/descend step with a normalized reciprocal gait pattern; unable to ambulate distances linger than a few feet; unable to squat; unable to participate in recreational and sporting activities  2/24    Posture: WNL 2/24    Bandages/Dressings/Incisions: glue applied over incisions; no signs or symptoms of infection  2/24    Gait: (include devices/WB status) WBAT with use of bilateral cruthces  2/24                       [x] Patient history, allergies, meds reviewed. Medical chart reviewed. See intake form. 2/24    Review Of Systems (ROS):  [x]Performed Review of systems (Integumentary, CardioPulmonary, Neurological) by intake and observation. Intake form has been scanned into medical record. Patient has been instructed to contact their primary care physician regarding ROS issues if not already being addressed at this time.   2/24    RESTRICTIONS/PRECAUTIONS:  L LME 2/23/23    Exercises/Interventions:   Exercise/Equipment Resistance/Repetitions Other comments   Stretching     Hamstring 30 sec x 5 Start 2/24   Towel Pull 30 sec x 5 Start 2/24   Inclined Calf     Hip Flexion     ITB     Groin     Quad                    SLR     Supine 3 x 10; 2# ^2/28   Abduction 3 x 10; 2# ^2/28   Adduction 3 x 10; 2# Start 2/28   Prone 3 x 10; 2# Start 2/28   SLR+          Isometrics     Quad sets 10 sec x 10 Start 2/24   Adduction sets 10 sec x 10 Start 2/24 Patellar Glides     Medial     Superior     Inferior          ROM     Sheet Pulls 10 sec x 10 Start 2/24   Hang Weights     Passive     Active     Weight Shift     Ankle Pumps 30x Start 2/24                  CKC     Calf raises 3 x 10 Start 2/28   Wall sits     Step ups     1 leg stand     Squatting     CC TKE     Balance     bridges          PRE     Extension 3 x 10 with ball squeeze RANGE: start 2/28   Flexion 3 x 10 RANGE: start 2/28        Quantum machines     Leg press      Leg extension     Leg curl          Manual interventions                     Therapeutic Exercise and NMR EXR  [x] (07713) Provided verbal/tactile cueing for activities related to strengthening, flexibility, endurance, ROM for improvements in LE, proximal hip, and core control with self care, mobility, lifting, ambulation. [x] (02382) Provided verbal/tactile cueing for activities related to improving balance, coordination, kinesthetic sense, posture, motor skill, proprioception  to assist with LE, proximal hip, and core control in self care, mobility, lifting, ambulation and eccentric single leg control.      NMR and Therapeutic Activities:    [x] (30542 or 67737) Provided verbal/tactile cueing for activities related to improving balance, coordination, kinesthetic sense, posture, motor skill, proprioception and motor activation to allow for proper function of core, proximal hip and LE with self care and ADLs  [] (23128) Gait Re-education- Provided training and instruction to the patient for proper LE, core and proximal hip recruitment and positioning and eccentric body weight control with ambulation re-education including up and down stairs     Home Exercise Program:    [x] (53631) Reviewed/Progressed HEP activities related to strengthening, flexibility, endurance, ROM of core, proximal hip and LE for functional self-care, mobility, lifting and ambulation/stair navigation   [] (68238)Reviewed/Progressed HEP activities related to improving balance, coordination, kinesthetic sense, posture, motor skill, proprioception of core, proximal hip and LE for self care, mobility, lifting, and ambulation/stair navigation      Manual Treatments:  PROM / STM / Oscillations-Mobs:  G-I, II, III, IV (PA's, Inf., Post.)  [] (11339) Provided manual therapy to mobilize LE, proximal hip and/or LS spine soft tissue/joints for the purpose of modulating pain, promoting relaxation,  increasing ROM, reducing/eliminating soft tissue swelling/inflammation/restriction, improving soft tissue extensibility and allowing for proper ROM for normal function with self care, mobility, lifting and ambulation. Modalities:  CP 10min  2/28    Charges:  Timed Code Treatment Minutes: 40   Total Treatment Minutes: 52  12:33-1:25       [] EVAL (LOW) 07873 (typically 20 minutes face-to-face)  [] EVAL (MOD) 89258 (typically 30 minutes face-to-face)  [] EVAL (HIGH) 44625 (typically 45 minutes face-to-face)  [] RE-EVAL   [x] DL(28144) x 2    [] IONTO  [x] NMR (68366) x 1    [] VASO  [] Manual (49463) x      [] Other:  [] TA x      [] Mech Traction (08364)  [] ES(attended) (31858)      [] ES (un) (08170):     GOALS:   Patient stated goal: Return to full activity; Playing basketbal    [] Progressing: [] Met: [] Not Met: [] Adjusted    Therapist goals for Patient:   Short Term Goals: To be achieved in: 2 weeks  1. Independent in HEP and progression per patient tolerance, in order to prevent re-injury. [] Progressing: [] Met: [] Not Met: [] Adjusted   2. Patient will have a decrease in pain to facilitate improvement in movement, function, and ADLs as indicated by Functional Deficits. [] Progressing: [] Met: [] Not Met: [] Adjusted    Long Term Goals: To be achieved in: 8 weeks  1. Disability index score of 25% or less for the LEFS to assist with reaching prior level of function. [] Progressing: [] Met: [] Not Met: [] Adjusted  2.  Patient will demonstrate increased AROM to WNL to allow for proper joint functioning as indicated by patients Functional Deficits. [] Progressing: [] Met: [] Not Met: [] Adjusted  3. Patient will demonstrate an increase in Strength to good proximal hip strength and control  in LE to allow for proper functional mobility as indicated by patients Functional Deficits. [] Progressing: [] Met: [] Not Met: [] Adjusted  4. Patient will return to Independent functional activities without increased symptoms or restriction. [] Progressing: [] Met: [] Not Met: [] Adjusted  5. Patient will report returning to playing basketball and full gym program in 12 weeks. [] Progressing: [] Met: [] Not Met: [] Adjusted     Overall Progression Towards Functional goals/ Treatment Progress Update:  [] Patient is progressing as expected towards functional goals listed. [] Progression is slowed due to complexities/Impairments listed. [] Progression has been slowed due to co-morbidities. [x] Plan just implemented, too soon to assess goals progression <30days   [] Goals require adjustment due to lack of progress  [] Patient is not progressing as expected and requires additional follow up with physician  [] Other    Prognosis for POC: [x] Good [] Fair  [] Poor      Patient requires continued skilled intervention: [x] Yes  [] No    Treatment/Activity Tolerance:  [x] Patient able to complete treatment  [] Patient limited by fatigue  [] Patient limited by pain     [] Patient limited by other medical complications  [x] Other: 2/28 Patient tolerated treatment well this session. No reports of pain. Able to flex knee to 104 this session. Increased quad contraction noted with patient able to perform SLR with no quad lag present. HEP updated and reviewed with patient. Continue to progress as tolerated.      Patient Education:                  PLAN: See eval  [x] Continue per plan of care [] Alter current plan (see comments above)  [] Plan of care initiated [] Hold pending MD visit [] Discharge      Electronically signed by:  Kristina Patel, PT, DPT    Note: If patient does not return for scheduled/ recommended follow up visits, this note will serve as a discharge from care along with most recent update on progress.

## 2023-03-02 ENCOUNTER — HOSPITAL ENCOUNTER (OUTPATIENT)
Dept: PHYSICAL THERAPY | Age: 28
Setting detail: THERAPIES SERIES
Discharge: HOME | End: 2023-03-02
Payer: COMMERCIAL

## 2023-03-02 PROCEDURE — 97112 NEUROMUSCULAR REEDUCATION: CPT

## 2023-03-02 PROCEDURE — 97110 THERAPEUTIC EXERCISES: CPT

## 2023-03-02 NOTE — FLOWSHEET NOTE
The [address] [place] [address] 200, [address] [place] [address], [address] [place] [address]  Phone: [phone]   Fax:     [phone]    Physical Therapy Daily Treatment Note  Date:  3/2/2023    Patient Name:  Faye Drummond    :  1995  MRN: [id number]  Restrictions/Precautions:    Medical/Treatment Diagnosis Information:  Diagnosis: S83.282A (ICD-10-CM) - Tear of lateral meniscus of left knee, unspecified tear type, unspecified whether old or current tear, initial encounter; Z98.890 (ICD-10-CM) - S/P lateral meniscectomy of left knee  Treatment Diagnosis: M25.562 Left Knee Pain  Insurance/Certification information:  PT Insurance Information: [facility]  Physician Information:   Liz Hawley MD  Has the plan of care been signed (Y/N):        []  Yes  [x]  No     Date of Patient follow up with Physician:       Is this a Progress Report:     []  Yes  [x]  No        If Yes:  Date Range for reporting period:  Beginning 23  Ending23    Progress report will be due (10 Rx or 30 days whichever is less):        Recertification will be due (POC Duration  / 90 days whichever is less): 23         Visit # Insurance Allowable Auth Required   3  3/2 60 []  Yes []  No        Functional Scale: LEFS: 72.5% deficit    Date assessed:         Latex Allergy:  [x]NO      []YES  Preferred Language for Healthcare:   [x]English       []other:    Pain level:  3-4/10   3/2     SUBJECTIVE:  3/2 Patient states that he is doing well. He states that he is feeling better.     OBJECTIVE:   Flexibility   L R Comment   Hamstring Moderate restriction     Gastroc Mild restriction     ITB      Quad              ROM  PROM AROM Overpressure Comment    L R L R L R    Flexion  104  WNL      Extension   0 0                              Strength   L R Comment   Quad Fair contraction     Hamstring      Gastroc      Hip  flexion      Hip abd                      Special Test 2/24 Results/Comment   Meniscal Click    Crepitus    Flexion Test    Valgus Laxity    Varus Laxity    Lachmans    Drop Back    Homans negative           Girth  2/24 L R   Mid Patella 44 cm 43 cm   Suprapatellar 44.5 cm 42.8 cm   5cm above 44.5 cm 45 cm   15cm above       Reflexes/Sensation: 2/24   [x]Dermatomes/Myotomes intact    []Reflexes equal and normal bilaterally   []Other:    Joint mobility: 2/24    [x]Normal    []Hypo   []Hyper    Palpation: Denies TTP 2/24    Functional Mobility/Transfers: Modified independent with increase pain and use of bilateral crutches; unable to ascend/descend step with a normalized reciprocal gait pattern; unable to ambulate distances linger than a few feet; unable to squat; unable to participate in recreational and sporting activities  2/24    Posture: WNL 2/24    Bandages/Dressings/Incisions: glue applied over incisions; no signs or symptoms of infection  2/24    Gait: (include devices/WB status) WBAT with use of bilateral cruthces  2/24                       [x] Patient history, allergies, meds reviewed. Medical chart reviewed. See intake form. 2/24    Review Of Systems (ROS):  [x]Performed Review of systems (Integumentary, CardioPulmonary, Neurological) by intake and observation. Intake form has been scanned into medical record. Patient has been instructed to contact their primary care physician regarding ROS issues if not already being addressed at this time.   2/24    RESTRICTIONS/PRECAUTIONS:  L LME 2/23/23    Exercises/Interventions:   Exercise/Equipment Resistance/Repetitions Other comments   Stretching     Hamstring 30 sec x 5 Start 2/24   Towel Pull 30 sec x 5 Start 2/24   Inclined Calf     Hip Flexion     ITB     Groin     Quad                    SLR     Supine 3 x 10; 4# ^3/2   Abduction 3 x 10; 4# ^3/2   Adduction 3 x 10; 4# ^3/2   Prone 3 x 10; 4# ^3/2   SLR+ 3 x 30 sec Start 3/2        Isometrics     Quad sets 10 sec x 10 Start 2/24   Adduction sets 10 sec x 10 Start 2/24        Patellar Glides     Medial     Superior     Inferior          ROM     Sheet Pulls 10 sec x 10 Start 2/24   Hang Weights     Passive     Active     Weight Shift     Ankle Pumps 30x Start 2/24                  CKC     Calf raises 3 x 10 Start 2/28   Wall sits     Step ups     1 leg stand     Squatting     CC TKE     Balance     bridges     clamshells 3 x 10; blue band Start 3/2   PRE     Extension 3 x 10 with ball squeeze; 4# RANGE: ^3/2   Flexion 3 x 10; 4# RANGE: ^3/2        Quantum machines     Leg press      Leg extension     Leg curl          Manual interventions                     Therapeutic Exercise and NMR EXR  [x] (09380) Provided verbal/tactile cueing for activities related to strengthening, flexibility, endurance, ROM for improvements in LE, proximal hip, and core control with self care, mobility, lifting, ambulation. [x] (33507) Provided verbal/tactile cueing for activities related to improving balance, coordination, kinesthetic sense, posture, motor skill, proprioception  to assist with LE, proximal hip, and core control in self care, mobility, lifting, ambulation and eccentric single leg control.      NMR and Therapeutic Activities:    [x] (59931 or 27407) Provided verbal/tactile cueing for activities related to improving balance, coordination, kinesthetic sense, posture, motor skill, proprioception and motor activation to allow for proper function of core, proximal hip and LE with self care and ADLs  [] (06770) Gait Re-education- Provided training and instruction to the patient for proper LE, core and proximal hip recruitment and positioning and eccentric body weight control with ambulation re-education including up and down stairs     Home Exercise Program:    [x] (09374) Reviewed/Progressed HEP activities related to strengthening, flexibility, endurance, ROM of core, proximal hip and LE for functional self-care, mobility, lifting and ambulation/stair navigation   [] (04189)Reviewed/Progressed HEP activities related to improving balance, coordination, kinesthetic sense, posture, motor skill, proprioception of core, proximal hip and LE for self care, mobility, lifting, and ambulation/stair navigation      Manual Treatments:  PROM / STM / Oscillations-Mobs:  G-I, II, III, IV (PA's, Inf., Post.)  [] (90045) Provided manual therapy to mobilize LE, proximal hip and/or LS spine soft tissue/joints for the purpose of modulating pain, promoting relaxation,  increasing ROM, reducing/eliminating soft tissue swelling/inflammation/restriction, improving soft tissue extensibility and allowing for proper ROM for normal function with self care, mobility, lifting and ambulation. Modalities:  CP 10min  3/2    Charges:  Timed Code Treatment Minutes: 45   Total Treatment Minutes: 60  12:32-1:32       [] EVAL (LOW) 71293 (typically 20 minutes face-to-face)  [] EVAL (MOD) 16842 (typically 30 minutes face-to-face)  [] EVAL (HIGH) 71306 (typically 45 minutes face-to-face)  [] RE-EVAL   [x] TQ(65665) x 2    [] IONTO  [x] NMR (87003) x 1    [] VASO  [] Manual (86731) x      [] Other:  [] TA x      [] Mech Traction (61176)  [] ES(attended) (63355)      [] ES (un) (45023):     GOALS:   Patient stated goal: Return to full activity; Playing basketbal    [] Progressing: [] Met: [] Not Met: [] Adjusted    Therapist goals for Patient:   Short Term Goals: To be achieved in: 2 weeks  1. Independent in HEP and progression per patient tolerance, in order to prevent re-injury. [] Progressing: [] Met: [] Not Met: [] Adjusted   2. Patient will have a decrease in pain to facilitate improvement in movement, function, and ADLs as indicated by Functional Deficits. [] Progressing: [] Met: [] Not Met: [] Adjusted    Long Term Goals: To be achieved in: 8 weeks  1. Disability index score of 25% or less for the LEFS to assist with reaching prior level of function.    [] Progressing: [] Met: [] Not Met: [] Adjusted  2. Patient will demonstrate increased AROM to WNL to allow for proper joint functioning as indicated by patients Functional Deficits. [] Progressing: [] Met: [] Not Met: [] Adjusted  3. Patient will demonstrate an increase in Strength to good proximal hip strength and control  in LE to allow for proper functional mobility as indicated by patients Functional Deficits. [] Progressing: [] Met: [] Not Met: [] Adjusted  4. Patient will return to Independent functional activities without increased symptoms or restriction. [] Progressing: [] Met: [] Not Met: [] Adjusted  5. Patient will report returning to playing basketball and full gym program in 12 weeks. [] Progressing: [] Met: [] Not Met: [] Adjusted     Overall Progression Towards Functional goals/ Treatment Progress Update:  [] Patient is progressing as expected towards functional goals listed. [] Progression is slowed due to complexities/Impairments listed. [] Progression has been slowed due to co-morbidities. [x] Plan just implemented, too soon to assess goals progression <30days   [] Goals require adjustment due to lack of progress  [] Patient is not progressing as expected and requires additional follow up with physician  [] Other    Prognosis for POC: [x] Good [] Fair  [] Poor      Patient requires continued skilled intervention: [x] Yes  [] No    Treatment/Activity Tolerance:  [x] Patient able to complete treatment  [] Patient limited by fatigue  [] Patient limited by pain     [] Patient limited by other medical complications  [x] Other: 3/2 Patient tolerated treatment well this session. No reports of pain. Fatigued by increased in program. HEP updated and reviewed with patient. Continue to progress as tolerated. Patient Education:                Access Code: [email or web address]  URL: [email or web address]  Date: 02/24/2023  Prepared by: Reggie Sorensen    PLAN: See eval  [x] Continue per plan of care [] Alter current plan (see comments above)  [] Plan of care initiated [] Hold pending MD visit [] Discharge      Electronically signed by:  Reggie Sroensen, PT, DPT    Note: If patient does not return for scheduled/ recommended follow up visits, this note will serve as a discharge from care along with most recent update on progress.

## 2023-03-07 ENCOUNTER — HOSPITAL ENCOUNTER (OUTPATIENT)
Dept: PHYSICAL THERAPY | Age: 28
Setting detail: THERAPIES SERIES
Discharge: HOME | End: 2023-03-07
Payer: COMMERCIAL

## 2023-03-07 PROCEDURE — 97110 THERAPEUTIC EXERCISES: CPT

## 2023-03-07 PROCEDURE — 97112 NEUROMUSCULAR REEDUCATION: CPT

## 2023-03-07 NOTE — FLOWSHEET NOTE
The [address] [place] [address] 200, [address], [address] [place] [address]  Phone: [phone]   Fax:     [phone]    Physical Therapy Daily Treatment Note  Date:  3/7/2023    Patient Name:  Joshua Nguyen    :  1995  MRN: [id number]  Restrictions/Precautions:    Medical/Treatment Diagnosis Information:  Diagnosis: D43.148B (ICD-10-CM) - Tear of lateral meniscus of left knee, unspecified tear type, unspecified whether old or current tear, initial encounter; Z98.890 (ICD-10-CM) - S/P lateral meniscectomy of left knee  Treatment Diagnosis: M25.562 Left Knee Pain  Insurance/Certification information:  PT Insurance Information: [facility]  Physician Information:   Israel Riley MD  Has the plan of care been signed (Y/N):        []  Yes  [x]  No     Date of Patient follow up with Physician:       Is this a Progress Report:     []  Yes  [x]  No        If Yes:  Date Range for reporting period:  Beginning 23  Ending23    Progress report will be due (10 Rx or 30 days whichever is less): 68       Recertification will be due (POC Duration  / 90 days whichever is less): 23         Visit # Insurance Allowable Auth Required   4  3/7 60 []  Yes []  No        Functional Scale: LEFS: 72.5% deficit    Date assessed:         Latex Allergy:  [x]NO      []YES  Preferred Language for Healthcare:   [x]English       []other:    Pain level:  3-4/10   3/7     SUBJECTIVE:  3/7 Patient states that he is doing well. He states that he is feeling better.     OBJECTIVE:   Flexibility   L R Comment   Hamstring Moderate restriction     Gastroc Mild restriction     ITB      Quad              ROM  PROM AROM Overpressure Comment    L R L R L R    Flexion  104  WNL      Extension   0 0                              Strength   L R Comment   Quad Fair contraction     Hamstring      Gastroc      Hip  flexion      Hip abd                      Special Test 2/24 Results/Comment   Meniscal Click    Crepitus    Flexion Test    Valgus Laxity    Varus Laxity    Lachmans    Drop Back    Homans negative           Girth  2/24 L R   Mid Patella 44 cm 43 cm   Suprapatellar 44.5 cm 42.8 cm   5cm above 44.5 cm 45 cm   15cm above       Reflexes/Sensation: 2/24   [x]Dermatomes/Myotomes intact    []Reflexes equal and normal bilaterally   []Other:    Joint mobility: 2/24    [x]Normal    []Hypo   []Hyper    Palpation: Denies TTP 2/24    Functional Mobility/Transfers: Modified independent with increase pain and use of bilateral crutches; unable to ascend/descend step with a normalized reciprocal gait pattern; unable to ambulate distances linger than a few feet; unable to squat; unable to participate in recreational and sporting activities  2/24    Posture: WNL 2/24    Bandages/Dressings/Incisions: glue applied over incisions; no signs or symptoms of infection  2/24    Gait: (include devices/WB status) WBAT with use of bilateral cruthces  2/24                       [x] Patient history, allergies, meds reviewed. Medical chart reviewed. See intake form. 2/24    Review Of Systems (ROS):  [x]Performed Review of systems (Integumentary, CardioPulmonary, Neurological) by intake and observation. Intake form has been scanned into medical record. Patient has been instructed to contact their primary care physician regarding ROS issues if not already being addressed at this time.   2/24    RESTRICTIONS/PRECAUTIONS:  L LME 2/23/23    Exercises/Interventions:   Exercise/Equipment Resistance/Repetitions Other comments   Stretching     Hamstring 30 sec x 5 Start 2/24   Towel Pull 30 sec x 5 Start 2/24   Inclined Calf     Hip Flexion     ITB     Groin     Quad                    SLR     Supine 3 x 10; 7# ^3/7   Abduction 3 x 10; 7# ^3/7   Adduction 3 x 10; 7# ^3/7   Prone 3 x 10; 7# ^3/7   SLR+ 3 x 30 sec Start 3/2        Isometrics     Quad sets 10 sec x 10 Start 2/24   Adduction sets 10 sec x 10 Start 2/24        Patellar Glides     Medial     Superior     Inferior          ROM     Sheet Pulls 10 sec x 10 Start 2/24   Hang Weights     Passive     Active     Weight Shift     Ankle Pumps 30x Start 2/24   Bike 5 min Start 3/7             CKC     Calf raises 3 x 10 Start 2/28   Wall sits     Step ups     Lateral band walks 3 x 10; blue band Start 3/7   1 leg stand     Squatting     CC TKE     Balance     bridges 3 x 10;  Start 3/7   clamshells 3 x 10; blue band Start 3/2   PRE     Extension 3 x 10 with ball squeeze; 4# RANGE: ^3/2   Flexion 3 x 10; 4# RANGE: ^3/2        Quantum machines     Leg press  3 x 10; 90# Start 3/7   Leg extension     Leg curl          Manual interventions                     Therapeutic Exercise and NMR EXR  [x] (58461) Provided verbal/tactile cueing for activities related to strengthening, flexibility, endurance, ROM for improvements in LE, proximal hip, and core control with self care, mobility, lifting, ambulation. [x] (17318) Provided verbal/tactile cueing for activities related to improving balance, coordination, kinesthetic sense, posture, motor skill, proprioception  to assist with LE, proximal hip, and core control in self care, mobility, lifting, ambulation and eccentric single leg control.      NMR and Therapeutic Activities:    [x] (79669 or 60236) Provided verbal/tactile cueing for activities related to improving balance, coordination, kinesthetic sense, posture, motor skill, proprioception and motor activation to allow for proper function of core, proximal hip and LE with self care and ADLs  [] (31177) Gait Re-education- Provided training and instruction to the patient for proper LE, core and proximal hip recruitment and positioning and eccentric body weight control with ambulation re-education including up and down stairs     Home Exercise Program:    [x] (56065) Reviewed/Progressed HEP activities related to strengthening, flexibility, endurance, ROM of core, proximal hip and LE for functional self-care, mobility, lifting and ambulation/stair navigation   [] (12706)Reviewed/Progressed HEP activities related to improving balance, coordination, kinesthetic sense, posture, motor skill, proprioception of core, proximal hip and LE for self care, mobility, lifting, and ambulation/stair navigation      Manual Treatments:  PROM / STM / Oscillations-Mobs:  G-I, II, III, IV (PA's, Inf., Post.)  [] (38986) Provided manual therapy to mobilize LE, proximal hip and/or LS spine soft tissue/joints for the purpose of modulating pain, promoting relaxation,  increasing ROM, reducing/eliminating soft tissue swelling/inflammation/restriction, improving soft tissue extensibility and allowing for proper ROM for normal function with self care, mobility, lifting and ambulation. Modalities:    Charges:  Timed Code Treatment Minutes: 45   Total Treatment Minutes: 55  8:05-9:00       [] EVAL (LOW) 32378 (typically 20 minutes face-to-face)  [] EVAL (MOD) 86033 (typically 30 minutes face-to-face)  [] EVAL (HIGH) 14363 (typically 45 minutes face-to-face)  [] RE-EVAL   [x] TY(05074) x 2    [] IONTO  [x] NMR (93993) x 1    [] VASO  [] Manual (39657) x      [] Other:  [] TA x      [] Mech Traction (80264)  [] ES(attended) (44129)      [] ES (un) (75487):     GOALS:   Patient stated goal: Return to full activity; Playing basketbal    [] Progressing: [] Met: [] Not Met: [] Adjusted    Therapist goals for Patient:   Short Term Goals: To be achieved in: 2 weeks  1. Independent in HEP and progression per patient tolerance, in order to prevent re-injury. [] Progressing: [] Met: [] Not Met: [] Adjusted   2. Patient will have a decrease in pain to facilitate improvement in movement, function, and ADLs as indicated by Functional Deficits. [] Progressing: [] Met: [] Not Met: [] Adjusted    Long Term Goals: To be achieved in: 8 weeks  1.  Disability index score of 25% or less for the LEFS to assist with reaching prior level of function. [] Progressing: [] Met: [] Not Met: [] Adjusted  2. Patient will demonstrate increased AROM to WNL to allow for proper joint functioning as indicated by patients Functional Deficits. [] Progressing: [] Met: [] Not Met: [] Adjusted  3. Patient will demonstrate an increase in Strength to good proximal hip strength and control  in LE to allow for proper functional mobility as indicated by patients Functional Deficits. [] Progressing: [] Met: [] Not Met: [] Adjusted  4. Patient will return to Independent functional activities without increased symptoms or restriction. [] Progressing: [] Met: [] Not Met: [] Adjusted  5. Patient will report returning to playing basketball and full gym program in 12 weeks. [] Progressing: [] Met: [] Not Met: [] Adjusted     Overall Progression Towards Functional goals/ Treatment Progress Update:  [] Patient is progressing as expected towards functional goals listed. [] Progression is slowed due to complexities/Impairments listed. [] Progression has been slowed due to co-morbidities. [x] Plan just implemented, too soon to assess goals progression <30days   [] Goals require adjustment due to lack of progress  [] Patient is not progressing as expected and requires additional follow up with physician  [] Other    Prognosis for POC: [x] Good [] Fair  [] Poor      Patient requires continued skilled intervention: [x] Yes  [] No    Treatment/Activity Tolerance:  [x] Patient able to complete treatment  [] Patient limited by fatigue  [] Patient limited by pain     [] Patient limited by other medical complications  [x] Other: 3/7 Patient tolerated treatment well this session. No reports of pain. Fatigued by increases in program. No pain reported with addition of bike and leg press. Continue to progress as tolerated. Patient Education:                Access Code: [email or web address]  URL: [email or web address]  Date: 02/24/2023  Prepared by: Elaina Hahn    PLAN: See eval  [x] Continue per plan of care [] Alter current plan (see comments above)  [] Plan of care initiated [] Hold pending MD visit [] Discharge      Electronically signed by:  Carissa Fitch, PT, DPT    Note: If patient does not return for scheduled/ recommended follow up visits, this note will serve as a discharge from care along with most recent update on progress.

## 2023-03-09 ENCOUNTER — HOSPITAL ENCOUNTER (OUTPATIENT)
Dept: PHYSICAL THERAPY | Age: 28
Setting detail: THERAPIES SERIES
Discharge: HOME | End: 2023-03-09
Payer: COMMERCIAL

## 2023-03-09 PROCEDURE — 97530 THERAPEUTIC ACTIVITIES: CPT

## 2023-03-09 PROCEDURE — 97110 THERAPEUTIC EXERCISES: CPT

## 2023-03-09 PROCEDURE — 97112 NEUROMUSCULAR REEDUCATION: CPT

## 2023-03-09 NOTE — FLOWSHEET NOTE
The [address] [place], [place] [address], [address], [address] [place] [address]  Phone: [phone]   Fax:     [phone]    Physical Therapy Daily Treatment Note  Date:  3/9/2023    Patient Name:  Ilana Ye    :  1995  MRN: [id number]  Restrictions/Precautions:    Medical/Treatment Diagnosis Information:  Diagnosis: R65.536W (ICD-10-CM) - Tear of lateral meniscus of left knee, unspecified tear type, unspecified whether old or current tear, initial encounter; Z98.890 (ICD-10-CM) - S/P lateral meniscectomy of left knee  Treatment Diagnosis: M25.562 Left Knee Pain  Insurance/Certification information:  PT Insurance Information: [facility]  Physician Information:   Nirav Santos MD  Has the plan of care been signed (Y/N):        []  Yes  [x]  No     Date of Patient follow up with Physician:       Is this a Progress Report:     []  Yes  [x]  No        If Yes:  Date Range for reporting period:  Beginning 23  Ending23    Progress report will be due (10 Rx or 30 days whichever is less): 80       Recertification will be due (POC Duration  / 90 days whichever is less): 23         Visit # Insurance Allowable Auth Required   5  3/9 60 []  Yes []  No        Functional Scale: LEFS: 72.5% deficit    Date assessed:         Latex Allergy:  [x]NO      []YES  Preferred Language for Healthcare:   [x]English       []other:    Pain level:  3-/10   3/9     SUBJECTIVE:  3/9 Patient states that he is doing well. He states that he is feeling better.     OBJECTIVE:   Flexibility   L R Comment   Hamstring Moderate restriction     Gastroc Mild restriction     ITB      Quad              ROM  PROM AROM Overpressure Comment    L R L R L R    Flexion  104  WNL      Extension   0 0                              Strength   L R Comment   Quad Fair contraction     Hamstring      Gastroc      Hip  flexion      Hip abd                      Special Test 2/24 Results/Comment   Meniscal Click    Crepitus    Flexion Test    Valgus Laxity    Varus Laxity    Lachmans    Drop Back    Homans negative           Girth  2/24 L R   Mid Patella 44 cm 43 cm   Suprapatellar 44.5 cm 42.8 cm   5cm above 44.5 cm 45 cm   15cm above       Reflexes/Sensation: 2/24   [x]Dermatomes/Myotomes intact    []Reflexes equal and normal bilaterally   []Other:    Joint mobility: 2/24    [x]Normal    []Hypo   []Hyper    Palpation: Denies TTP 2/24    Functional Mobility/Transfers: Modified independent with increase pain and use of bilateral crutches; unable to ascend/descend step with a normalized reciprocal gait pattern; unable to ambulate distances linger than a few feet; unable to squat; unable to participate in recreational and sporting activities  2/24    Posture: WNL 2/24    Bandages/Dressings/Incisions: glue applied over incisions; no signs or symptoms of infection  2/24    Gait: (include devices/WB status) WBAT with use of bilateral cruthces  2/24                       [x] Patient history, allergies, meds reviewed. Medical chart reviewed. See intake form. 2/24    Review Of Systems (ROS):  [x]Performed Review of systems (Integumentary, CardioPulmonary, Neurological) by intake and observation. Intake form has been scanned into medical record. Patient has been instructed to contact their primary care physician regarding ROS issues if not already being addressed at this time.   2/24    RESTRICTIONS/PRECAUTIONS:  L LME 2/23/23    Exercises/Interventions:   Exercise/Equipment Resistance/Repetitions Other comments   Stretching     Hamstring 30 sec x 5 Start 2/24   Towel Pull 30 sec x 5 Start 2/24   Inclined Calf     Hip Flexion     ITB     Groin     Quad                    SLR     Supine 3 x 10; 8# ^3/9   Abduction 3 x 10; 8# ^3/9   Adduction 3 x 10; 8# ^3/9   Prone 3 x 10; 8# ^3/9   SLR+ 3 x 30 sec Start 3/2        Isometrics     Quad sets 10 sec x 10 Start 2/24   Adduction sets 10 sec x 10 Start 2/24        Patellar Glides     Medial     Superior     Inferior          ROM     Sheet Pulls 10 sec x 10 Start 2/24   Hang Weights     Passive     Active     Weight Shift     Ankle Pumps 30x Start 2/24   Bike 5 min Start 3/7             CKC     Calf raises 3 x 10 Start 2/28   Wall sits     Step ups     Lateral band walks 3 laps; blue band ^3/9   1 leg stand 30 sec x 3 Start 3/9   Squatting     CC TKE     Balance     bridges 3 x 10;  Start 3/7   clamshells 3 x 10; blue band Start 3/2   PRE     Extension 3 x 10 with ball squeeze; 8# RANGE: ^3/9   Flexion 3 x 10; 8# RANGE: ^3/9        Quantum machines     Leg press  3 x 10; 90# Start 3/7   Leg extension 3 x 10; 40# Start 3/9   Leg curl 3 x 10; 40# Start 3/9        Manual interventions                     Therapeutic Exercise and NMR EXR  [x] (05928) Provided verbal/tactile cueing for activities related to strengthening, flexibility, endurance, ROM for improvements in LE, proximal hip, and core control with self care, mobility, lifting, ambulation.  [x] (03461) Provided verbal/tactile cueing for activities related to improving balance, coordination, kinesthetic sense, posture, motor skill, proprioception  to assist with LE, proximal hip, and core control in self care, mobility, lifting, ambulation and eccentric single leg control.     NMR and Therapeutic Activities:    [x] (02766 or 87865) Provided verbal/tactile cueing for activities related to improving balance, coordination, kinesthetic sense, posture, motor skill, proprioception and motor activation to allow for proper function of core, proximal hip and LE with self care and ADLs  [x] (29429) Gait Re-education- Provided training and instruction to the patient for proper LE, core and proximal hip recruitment and positioning and eccentric body weight control with ambulation re-education including up and down stairs     Home Exercise Program:    [x] (13943) Reviewed/Progressed HEP activities related to strengthening,  flexibility, endurance, ROM of core, proximal hip and LE for functional self-care, mobility, lifting and ambulation/stair navigation   [] (40671)Reviewed/Progressed HEP activities related to improving balance, coordination, kinesthetic sense, posture, motor skill, proprioception of core, proximal hip and LE for self care, mobility, lifting, and ambulation/stair navigation      Manual Treatments:  PROM / STM / Oscillations-Mobs:  G-I, II, III, IV (PA's, Inf., Post.)  [] (80391) Provided manual therapy to mobilize LE, proximal hip and/or LS spine soft tissue/joints for the purpose of modulating pain, promoting relaxation,  increasing ROM, reducing/eliminating soft tissue swelling/inflammation/restriction, improving soft tissue extensibility and allowing for proper ROM for normal function with self care, mobility, lifting and ambulation. Modalities:    Charges:  Timed Code Treatment Minutes: 55   Total Treatment Minutes: 18  9:30-10:30       [] EVAL (LOW) 70512 (typically 20 minutes face-to-face)  [] EVAL (MOD) 06468 (typically 30 minutes face-to-face)  [] EVAL (HIGH) 11088 (typically 45 minutes face-to-face)  [] RE-EVAL   [x] HK(42194) x 2    [] IONTO  [x] NMR (68632) x 1    [] VASO  [] Manual (31471) x      [] Other:  [x] TA x 1      [] Mech Traction (67522)  [] ES(attended) (77462)      [] ES (un) (65657):     GOALS:   Patient stated goal: Return to full activity; Playing basketbal    [] Progressing: [] Met: [] Not Met: [] Adjusted    Therapist goals for Patient:   Short Term Goals: To be achieved in: 2 weeks  1. Independent in HEP and progression per patient tolerance, in order to prevent re-injury. [] Progressing: [] Met: [] Not Met: [] Adjusted   2. Patient will have a decrease in pain to facilitate improvement in movement, function, and ADLs as indicated by Functional Deficits. [] Progressing: [] Met: [] Not Met: [] Adjusted    Long Term Goals: To be achieved in: 8 weeks  1.  Disability index score of 25% or less for the LEFS to assist with reaching prior level of function. [] Progressing: [] Met: [] Not Met: [] Adjusted  2. Patient will demonstrate increased AROM to WNL to allow for proper joint functioning as indicated by patients Functional Deficits. [] Progressing: [] Met: [] Not Met: [] Adjusted  3. Patient will demonstrate an increase in Strength to good proximal hip strength and control  in LE to allow for proper functional mobility as indicated by patients Functional Deficits. [] Progressing: [] Met: [] Not Met: [] Adjusted  4. Patient will return to Independent functional activities without increased symptoms or restriction. [] Progressing: [] Met: [] Not Met: [] Adjusted  5. Patient will report returning to playing basketball and full gym program in 12 weeks. [] Progressing: [] Met: [] Not Met: [] Adjusted     Overall Progression Towards Functional goals/ Treatment Progress Update:  [] Patient is progressing as expected towards functional goals listed. [] Progression is slowed due to complexities/Impairments listed. [] Progression has been slowed due to co-morbidities. [x] Plan just implemented, too soon to assess goals progression <30days   [] Goals require adjustment due to lack of progress  [] Patient is not progressing as expected and requires additional follow up with physician  [] Other    Prognosis for POC: [x] Good [] Fair  [] Poor      Patient requires continued skilled intervention: [x] Yes  [] No    Treatment/Activity Tolerance:  [x] Patient able to complete treatment  [] Patient limited by fatigue  [] Patient limited by pain     [] Patient limited by other medical complications  [x] Other: 3/9 Patient tolerated treatment well this session. No reports of pain. Fatigued by increases in program. No pain reported with addition of machines; enjoyed the challenge. Continue to progress as tolerated. Patient Education:                Access Code:  [email or web address]  URL: [email or web address]  Date: 02/24/2023  Prepared by: Maxwell Piña    PLAN: See eval  [x] Continue per plan of care [] Alter current plan (see comments above)  [] Plan of care initiated [] Hold pending MD visit [] Discharge      Electronically signed by:  Maxwell Piña, PT, DPT    Note: If patient does not return for scheduled/ recommended follow up visits, this note will serve as a discharge from care along with most recent update on progress.

## 2023-03-14 ENCOUNTER — HOSPITAL ENCOUNTER (OUTPATIENT)
Dept: PHYSICAL THERAPY | Age: 28
Setting detail: THERAPIES SERIES
Discharge: HOME | End: 2023-03-14
Payer: COMMERCIAL

## 2023-03-14 PROCEDURE — 97110 THERAPEUTIC EXERCISES: CPT

## 2023-03-14 PROCEDURE — 97530 THERAPEUTIC ACTIVITIES: CPT

## 2023-03-14 PROCEDURE — 97112 NEUROMUSCULAR REEDUCATION: CPT

## 2023-03-14 NOTE — FLOWSHEET NOTE
[facility] 14, [address] [employer] [address] [place] [address], [address] [place] [address]  Phone: [phone]   Fax:     [phone]    Physical Therapy Daily Treatment Note  Date:  3/14/2023    Patient Name:  Anastasiia Casarez    :  1995  MRN: [id number]  Restrictions/Precautions:    Medical/Treatment Diagnosis Information:  Diagnosis: N03.838P (ICD-10-CM) - Tear of lateral meniscus of left knee, unspecified tear type, unspecified whether old or current tear, initial encounter; Z98.890 (ICD-10-CM) - S/P lateral meniscectomy of left knee  Treatment Diagnosis: M25.562 Left Knee Pain  Insurance/Certification information:  PT Insurance Information: [facility]  Physician Information:   Bekah Randall MD  Has the plan of care been signed (Y/N):        []  Yes  [x]  No     Date of Patient follow up with Physician:       Is this a Progress Report:     []  Yes  [x]  No        If Yes:  Date Range for reporting period:  Beginning 23  Ending23    Progress report will be due (10 Rx or 30 days whichever is less): 53       Recertification will be due (POC Duration  / 90 days whichever is less): 23         Visit # Insurance Allowable Auth Required   6  3/14 60 []  Yes []  No        Functional Scale: LEFS: 72.5% deficit    Date assessed:         Latex Allergy:  [x]NO      []YES  Preferred Language for Healthcare:   [x]English       []other:    Pain level:  3-4/10   3/9     SUBJECTIVE:  3/14 Patient states that he is doing well. He states that he is feeling better.     OBJECTIVE:   Flexibility   L R Comment   Hamstring Moderate restriction     Gastroc Mild restriction     ITB      Quad              ROM  PROM AROM Overpressure Comment    L R L R L R    Flexion  104  WNL      Extension   0 0                              Strength   L R Comment   Quad Fair contraction     Hamstring      Gastroc      Hip  flexion      Hip abd                      Special Test 2/24 Results/Comment   Meniscal Click    Crepitus    Flexion Test    Valgus Laxity    Varus Laxity    Lachmans    Drop Back    Homans negative           Girth  2/24 L R   Mid Patella 44 cm 43 cm   Suprapatellar 44.5 cm 42.8 cm   5cm above 44.5 cm 45 cm   15cm above       Reflexes/Sensation: 2/24   [x]Dermatomes/Myotomes intact    []Reflexes equal and normal bilaterally   []Other:    Joint mobility: 2/24    [x]Normal    []Hypo   []Hyper    Palpation: Denies TTP 2/24    Functional Mobility/Transfers: Modified independent with increase pain and use of bilateral crutches; unable to ascend/descend step with a normalized reciprocal gait pattern; unable to ambulate distances linger than a few feet; unable to squat; unable to participate in recreational and sporting activities  2/24    Posture: WNL 2/24    Bandages/Dressings/Incisions: glue applied over incisions; no signs or symptoms of infection  2/24    Gait: (include devices/WB status) WBAT with use of bilateral cruthces  2/24                       [x] Patient history, allergies, meds reviewed. Medical chart reviewed. See intake form. 2/24    Review Of Systems (ROS):  [x]Performed Review of systems (Integumentary, CardioPulmonary, Neurological) by intake and observation. Intake form has been scanned into medical record. Patient has been instructed to contact their primary care physician regarding ROS issues if not already being addressed at this time.   2/24    RESTRICTIONS/PRECAUTIONS:  L LME 2/23/23    Exercises/Interventions:   Exercise/Equipment Resistance/Repetitions Other comments   Stretching     Hamstring 30 sec x 5 Start 2/24   Towel Pull 30 sec x 5 Start 2/24   Inclined Calf     Hip Flexion     ITB     Groin     Quad                    SLR     Supine 3 x 10; 10# ^3/14   Abduction 3 x 10; 10# ^3/14   Adduction 3 x 10; 10# ^3/14   Prone 3 x 10; 10# ^3/14   SLR+ 3 x 30 sec Start 3/2        Isometrics     Quad sets 10 sec x 10 Start 2/24   Adduction sets 10 sec x 10 Start 2/24        Patellar Glides     Medial     Superior     Inferior          ROM     Sheet Pulls 10 sec x 10 Start 2/24   Hang Weights     Passive     Active     Weight Shift     Ankle Pumps 30x Start 2/24   Bike 8 min ^3/14             CKC     Calf raises 3 x 10; on biodex Start 2/28   Wall sits     Step ups     Lateral band walks 3 laps; blue band ^3/9   Monster walks (FWD/BKWD) 3 laps; blue band Start 3/14   1 leg stand 30 sec x 3 Start 3/9   Squatting Mini on biodex Start 3/14   CC TKE 3 x 10; 5 plates Start 9/99   Balance     plyoball 3 x 10; 2# ball Start 3/14   bridges 3 x 10;  Start 3/7   clamshells 3 x 10; blue band Start 3/2   PRE     Extension 3 x 10 with ball squeeze; 8# RANGE: ^3/9   Flexion 3 x 10; 8# RANGE: ^3/9        Quantum machines     Leg press  3 x 10; 145# DL  3 x 10; 85# SL ^3/14   Leg extension 3 x 10; 40# SL  3 x 10; 60# DL ^3/14   Leg curl 3 x 10; 30# SL  3 x 10; 60# ^3/14        Manual interventions                     Therapeutic Exercise and NMR EXR  [x] (68831) Provided verbal/tactile cueing for activities related to strengthening, flexibility, endurance, ROM for improvements in LE, proximal hip, and core control with self care, mobility, lifting, ambulation. [x] (51605) Provided verbal/tactile cueing for activities related to improving balance, coordination, kinesthetic sense, posture, motor skill, proprioception  to assist with LE, proximal hip, and core control in self care, mobility, lifting, ambulation and eccentric single leg control.      NMR and Therapeutic Activities:    [x] (56104 or 43265) Provided verbal/tactile cueing for activities related to improving balance, coordination, kinesthetic sense, posture, motor skill, proprioception and motor activation to allow for proper function of core, proximal hip and LE with self care and ADLs  [x] (74799) Gait Re-education- Provided training and instruction to the patient for proper LE, core and proximal hip recruitment and positioning and eccentric body weight control with ambulation re-education including up and down stairs     Home Exercise Program:    [x] (60713) Reviewed/Progressed HEP activities related to strengthening, flexibility, endurance, ROM of core, proximal hip and LE for functional self-care, mobility, lifting and ambulation/stair navigation   [] (21095)Reviewed/Progressed HEP activities related to improving balance, coordination, kinesthetic sense, posture, motor skill, proprioception of core, proximal hip and LE for self care, mobility, lifting, and ambulation/stair navigation      Manual Treatments:  PROM / STM / Oscillations-Mobs:  G-I, II, III, IV (PA's, Inf., Post.)  [] (77421) Provided manual therapy to mobilize LE, proximal hip and/or LS spine soft tissue/joints for the purpose of modulating pain, promoting relaxation,  increasing ROM, reducing/eliminating soft tissue swelling/inflammation/restriction, improving soft tissue extensibility and allowing for proper ROM for normal function with self care, mobility, lifting and ambulation. Modalities:    Charges:  Timed Code Treatment Minutes: 55   Total Treatment Minutes: 90  9:30-11:00       [] EVAL (LOW) 02769 (typically 20 minutes face-to-face)  [] EVAL (MOD) 84123 (typically 30 minutes face-to-face)  [] EVAL (HIGH) 11647 (typically 45 minutes face-to-face)  [] RE-EVAL   [x] FW(71078) x 2    [] IONTO  [x] NMR (13197) x 1    [] VASO  [] Manual (91819) x      [] Other:  [x] TA x 1      [] Mech Traction (96281)  [] ES(attended) (91528)      [] ES (un) (48586):     GOALS:   Patient stated goal: Return to full activity; Playing basketbal    [] Progressing: [] Met: [] Not Met: [] Adjusted    Therapist goals for Patient:   Short Term Goals: To be achieved in: 2 weeks  1. Independent in HEP and progression per patient tolerance, in order to prevent re-injury. [] Progressing: [] Met: [] Not Met: [] Adjusted   2.  Patient will have a decrease in pain to facilitate improvement in movement, function, and ADLs as indicated by Functional Deficits. [] Progressing: [] Met: [] Not Met: [] Adjusted    Long Term Goals: To be achieved in: 8 weeks  1. Disability index score of 25% or less for the LEFS to assist with reaching prior level of function. [] Progressing: [] Met: [] Not Met: [] Adjusted  2. Patient will demonstrate increased AROM to WNL to allow for proper joint functioning as indicated by patients Functional Deficits. [] Progressing: [] Met: [] Not Met: [] Adjusted  3. Patient will demonstrate an increase in Strength to good proximal hip strength and control  in LE to allow for proper functional mobility as indicated by patients Functional Deficits. [] Progressing: [] Met: [] Not Met: [] Adjusted  4. Patient will return to Independent functional activities without increased symptoms or restriction. [] Progressing: [] Met: [] Not Met: [] Adjusted  5. Patient will report returning to playing basketball and full gym program in 12 weeks. [] Progressing: [] Met: [] Not Met: [] Adjusted     Overall Progression Towards Functional goals/ Treatment Progress Update:  [] Patient is progressing as expected towards functional goals listed. [] Progression is slowed due to complexities/Impairments listed. [] Progression has been slowed due to co-morbidities. [x] Plan just implemented, too soon to assess goals progression <30days   [] Goals require adjustment due to lack of progress  [] Patient is not progressing as expected and requires additional follow up with physician  [] Other    Prognosis for POC: [x] Good [] Fair  [] Poor      Patient requires continued skilled intervention: [x] Yes  [] No    Treatment/Activity Tolerance:  [x] Patient able to complete treatment  [] Patient limited by fatigue  [] Patient limited by pain     [] Patient limited by other medical complications  [x] Other: 3/14 Patient tolerated treatment well this session. No reports of pain.  Fatigued by increases in program. No pain reported with addition of machines; enjoyed the challenge. Continue to progress as tolerated. Patient Education:                Access Code: [email or web address]  URL: [email or web address]  Date: 02/24/2023  Prepared by: Lane Barker    PLAN: See eval  [x] Continue per plan of care [] Alter current plan (see comments above)  [] Plan of care initiated [] Hold pending MD visit [] Discharge      Electronically signed by:  Lane Barker, PT, DPT    Note: If patient does not return for scheduled/ recommended follow up visits, this note will serve as a discharge from care along with most recent update on progress.

## 2023-03-16 ENCOUNTER — HOSPITAL ENCOUNTER (OUTPATIENT)
Dept: PHYSICAL THERAPY | Age: 28
Setting detail: THERAPIES SERIES
Discharge: HOME | End: 2023-03-16
Payer: COMMERCIAL

## 2023-03-16 PROCEDURE — 97110 THERAPEUTIC EXERCISES: CPT

## 2023-03-16 PROCEDURE — 97112 NEUROMUSCULAR REEDUCATION: CPT

## 2023-03-16 PROCEDURE — 97530 THERAPEUTIC ACTIVITIES: CPT

## 2023-03-16 NOTE — FLOWSHEET NOTE
The [address] [place], [place] [address], [address], [address] [place] [address]  Phone: [phone]   Fax:     [phone]    Physical Therapy Daily Treatment Note  Date:  3/16/2023    Patient Name:  Mitesh Wiley    :  1995  MRN: [id number]  Restrictions/Precautions:    Medical/Treatment Diagnosis Information:  Diagnosis: A68.059M (ICD-10-CM) - Tear of lateral meniscus of left knee, unspecified tear type, unspecified whether old or current tear, initial encounter; Z98.890 (ICD-10-CM) - S/P lateral meniscectomy of left knee  Treatment Diagnosis: M25.562 Left Knee Pain  Insurance/Certification information:  PT Insurance Information: [facility]  Physician Information:   Vibha Zamarripa MD  Has the plan of care been signed (Y/N):        []  Yes  [x]  No     Date of Patient follow up with Physician:       Is this a Progress Report:     []  Yes  [x]  No        If Yes:  Date Range for reporting period:  Beginning 23  Ending23    Progress report will be due (10 Rx or 30 days whichever is less):        Recertification will be due (POC Duration  / 90 days whichever is less): 23         Visit # Insurance Allowable Auth Required   6  3/14 60 []  Yes []  No        Functional Scale: LEFS: 72.5% deficit    Date assessed:         Latex Allergy:  [x]NO      []YES  Preferred Language for Healthcare:   [x]English       []other:    Pain level:  3-4/10   3/9     SUBJECTIVE:  3/14 Patient states that he is doing well. He states that he is feeling better.     OBJECTIVE:   Flexibility   L R Comment   Hamstring Moderate restriction     Gastroc Mild restriction     ITB      Quad              ROM  PROM AROM Overpressure Comment    L R L R L R    Flexion  104  WNL      Extension   0 0                              Strength   L R Comment   Quad Fair contraction     Hamstring      Gastroc      Hip  flexion      Hip abd                      Special Test 2/24 Results/Comment   Meniscal Click    Crepitus    Flexion Test    Valgus Laxity    Varus Laxity    Lachmans    Drop Back    Homans negative           Girth  2/24 L R   Mid Patella 44 cm 43 cm   Suprapatellar 44.5 cm 42.8 cm   5cm above 44.5 cm 45 cm   15cm above       Reflexes/Sensation: 2/24   [x]Dermatomes/Myotomes intact    []Reflexes equal and normal bilaterally   []Other:    Joint mobility: 2/24    [x]Normal    []Hypo   []Hyper    Palpation: Denies TTP 2/24    Functional Mobility/Transfers: Modified independent with increase pain and use of bilateral crutches; unable to ascend/descend step with a normalized reciprocal gait pattern; unable to ambulate distances linger than a few feet; unable to squat; unable to participate in recreational and sporting activities  2/24    Posture: WNL 2/24    Bandages/Dressings/Incisions: glue applied over incisions; no signs or symptoms of infection  2/24    Gait: (include devices/WB status) WBAT with use of bilateral cruthces  2/24                       [x] Patient history, allergies, meds reviewed. Medical chart reviewed. See intake form. 2/24    Review Of Systems (ROS):  [x]Performed Review of systems (Integumentary, CardioPulmonary, Neurological) by intake and observation. Intake form has been scanned into medical record. Patient has been instructed to contact their primary care physician regarding ROS issues if not already being addressed at this time.   2/24    RESTRICTIONS/PRECAUTIONS:  L LME 2/23/23    Exercises/Interventions:   Exercise/Equipment Resistance/Repetitions Other comments   Stretching     Hamstring 30 sec x 5 Start 2/24   Towel Pull 30 sec x 5 Start 2/24   Inclined Calf     Hip Flexion     ITB     Groin     Quad                    SLR     SLR+ 3 x 30 sec Start 3/2        Isometrics     Quad sets 10 sec x 10 Start 2/24   Adduction sets 10 sec x 10 Start 2/24        Patellar Glides     Medial     Superior     Inferior          ROM     Sheet Pulls 10 sec x 10 Start 2/24   Hang Weights     Passive     Active     Weight Shift     Ankle Pumps 30x Start 2/24   Bike 8 min ^3/14             CKC     Calf raises 3 x 10; on biodex Start 2/28   Wall sits 30 sec x 3 Start 3/16   Lateral step downs 3 x 10 Start 3/16   Lateral band walks 3 laps; blue band ^3/9   Monster walks (FWD/BKWD) 3 laps; blue band Start 3/14   1 leg stand 30 sec x 3 Start 3/9   Squatting Mini on biodex Start 3/14   CC TKE 3 x 10; 5 plates Start 3/14   Balance     plyoball 3 x 10; 2# ball Start 3/14   bridges 3 x 10;  Start 3/7   clamshells 3 x 10; blue band Start 3/2   PRE     Extension 3 x 10 with ball squeeze; 8# RANGE: ^3/9   Flexion 3 x 10; 8# RANGE: ^3/9        Quantum machines     Leg press  3 x 10; 205# DL  3 x 10; 105# SL ^3/16   Leg extension 3 x 10; 40# SL  3 x 10; 60# DL ^3/14   Leg curl 3 x 10; 30# SL  3 x 10; 60# ^3/14        Manual interventions                     Therapeutic Exercise and NMR EXR  [x] (45236) Provided verbal/tactile cueing for activities related to strengthening, flexibility, endurance, ROM for improvements in LE, proximal hip, and core control with self care, mobility, lifting, ambulation.  [x] (09991) Provided verbal/tactile cueing for activities related to improving balance, coordination, kinesthetic sense, posture, motor skill, proprioception  to assist with LE, proximal hip, and core control in self care, mobility, lifting, ambulation and eccentric single leg control.     NMR and Therapeutic Activities:    [x] (91321 or 99879) Provided verbal/tactile cueing for activities related to improving balance, coordination, kinesthetic sense, posture, motor skill, proprioception and motor activation to allow for proper function of core, proximal hip and LE with self care and ADLs  [x] (64944) Gait Re-education- Provided training and instruction to the patient for proper LE, core and proximal hip recruitment and positioning and eccentric body weight control with ambulation  re-education including up and down stairs     Home Exercise Program:    [x] (57745) Reviewed/Progressed HEP activities related to strengthening, flexibility, endurance, ROM of core, proximal hip and LE for functional self-care, mobility, lifting and ambulation/stair navigation   [] (04742)Reviewed/Progressed HEP activities related to improving balance, coordination, kinesthetic sense, posture, motor skill, proprioception of core, proximal hip and LE for self care, mobility, lifting, and ambulation/stair navigation      Manual Treatments:  PROM / STM / Oscillations-Mobs:  G-I, II, III, IV (PA's, Inf., Post.)  [] (13467) Provided manual therapy to mobilize LE, proximal hip and/or LS spine soft tissue/joints for the purpose of modulating pain, promoting relaxation,  increasing ROM, reducing/eliminating soft tissue swelling/inflammation/restriction, improving soft tissue extensibility and allowing for proper ROM for normal function with self care, mobility, lifting and ambulation. Modalities:    Charges:  Timed Code Treatment Minutes: 55   Total Treatment Minutes: 75  1:15-2:30       [] EVAL (LOW) 10105 (typically 20 minutes face-to-face)  [] EVAL (MOD) 30878 (typically 30 minutes face-to-face)  [] EVAL (HIGH) 55181 (typically 45 minutes face-to-face)  [] RE-EVAL   [x] EU(45299) x 2    [] IONTO  [x] NMR (29834) x 1    [] VASO  [] Manual (76721) x      [] Other:  [x] TA x 1      [] Mech Traction (88477)  [] ES(attended) (33190)      [] ES (un) (97092):     GOALS:   Patient stated goal: Return to full activity; Playing basketbal    [] Progressing: [] Met: [] Not Met: [] Adjusted    Therapist goals for Patient:   Short Term Goals: To be achieved in: 2 weeks  1. Independent in HEP and progression per patient tolerance, in order to prevent re-injury. [] Progressing: [] Met: [] Not Met: [] Adjusted   2.  Patient will have a decrease in pain to facilitate improvement in movement, function, and ADLs as indicated by Functional Deficits. [] Progressing: [] Met: [] Not Met: [] Adjusted    Long Term Goals: To be achieved in: 8 weeks  1. Disability index score of 25% or less for the LEFS to assist with reaching prior level of function. [] Progressing: [] Met: [] Not Met: [] Adjusted  2. Patient will demonstrate increased AROM to WNL to allow for proper joint functioning as indicated by patients Functional Deficits. [] Progressing: [] Met: [] Not Met: [] Adjusted  3. Patient will demonstrate an increase in Strength to good proximal hip strength and control  in LE to allow for proper functional mobility as indicated by patients Functional Deficits. [] Progressing: [] Met: [] Not Met: [] Adjusted  4. Patient will return to Independent functional activities without increased symptoms or restriction. [] Progressing: [] Met: [] Not Met: [] Adjusted  5. Patient will report returning to playing basketball and full gym program in 12 weeks. [] Progressing: [] Met: [] Not Met: [] Adjusted     Overall Progression Towards Functional goals/ Treatment Progress Update:  [] Patient is progressing as expected towards functional goals listed. [] Progression is slowed due to complexities/Impairments listed. [] Progression has been slowed due to co-morbidities. [x] Plan just implemented, too soon to assess goals progression <30days   [] Goals require adjustment due to lack of progress  [] Patient is not progressing as expected and requires additional follow up with physician  [] Other    Prognosis for POC: [x] Good [] Fair  [] Poor      Patient requires continued skilled intervention: [x] Yes  [] No    Treatment/Activity Tolerance:  [x] Patient able to complete treatment  [] Patient limited by fatigue  [] Patient limited by pain     [] Patient limited by other medical complications  [x] Other: 3/16 Patient tolerated treatment well this session. No reports of pain.  Fatigued by increases in program. No pain reported with addition of machines; enjoyed the challenge. Continue to progress as tolerated. Patient Education:                Access Code: [email or web address]  URL: [email or web address]  Date: 02/24/2023  Prepared by: Clyde Sacks    PLAN: See eval  [x] Continue per plan of care [] Alter current plan (see comments above)  [] Plan of care initiated [] Hold pending MD visit [] Discharge      Electronically signed by:  Clyde Sacks, PT, DPT    Note: If patient does not return for scheduled/ recommended follow up visits, this note will serve as a discharge from care along with most recent update on progress.

## 2023-03-21 ENCOUNTER — HOSPITAL ENCOUNTER (OUTPATIENT)
Dept: PHYSICAL THERAPY | Age: 28
Setting detail: THERAPIES SERIES
Discharge: HOME | End: 2023-03-21
Payer: COMMERCIAL

## 2023-03-21 PROCEDURE — 97530 THERAPEUTIC ACTIVITIES: CPT

## 2023-03-21 PROCEDURE — 97112 NEUROMUSCULAR REEDUCATION: CPT

## 2023-03-21 PROCEDURE — 97110 THERAPEUTIC EXERCISES: CPT

## 2023-03-21 NOTE — FLOWSHEET NOTE
sec x 10 Start 2/24   Hang Weights     Passive     Active     Weight Shift     Ankle Pumps 30x Start 2/24   Bike 8 min ^3/14             CKC     Calf raises 3 x 10; on biodex Start 2/28   Wall sits 30 sec x 3 Start 3/16   Lateral step downs 3 x 10 Start 3/16   Lateral band walks 3 laps; blue band ^3/9   Monster walks (FWD/BKWD) 3 laps; blue band Start 3/14   1 leg stand 30 sec x 3 Start 3/9   Baron ronnell 3 x 10 Start 3/21   Squatting Mini on biodex Start 3/14   CC TKE 3 x 10; 5 plates Start 2/83   Balance     plyoball 3 x 10; 2# ball Start 3/14   bridges 3 x 10;  Start 3/7   clamshells 3 x 10; blue band Start 3/2   PRE     Extension 3 x 10 with ball squeeze; 8# RANGE: ^3/9   Flexion 3 x 10; 8# RANGE: ^3/9        Quantum machines     Leg press  3 x 10; 205# DL  3 x 10; 105# SL ^3/16   Leg extension 3 x 10; 50# SL  3 x 10; 100# DL ^3/21   Leg curl 3 x 10; 50# SL  3 x 10; 80# ^3/21        Manual interventions                     Therapeutic Exercise and NMR EXR  [x] (99427) Provided verbal/tactile cueing for activities related to strengthening, flexibility, endurance, ROM for improvements in LE, proximal hip, and core control with self care, mobility, lifting, ambulation. [x] (70930) Provided verbal/tactile cueing for activities related to improving balance, coordination, kinesthetic sense, posture, motor skill, proprioception  to assist with LE, proximal hip, and core control in self care, mobility, lifting, ambulation and eccentric single leg control.      NMR and Therapeutic Activities:    [x] (06496 or 65065) Provided verbal/tactile cueing for activities related to improving balance, coordination, kinesthetic sense, posture, motor skill, proprioception and motor activation to allow for proper function of core, proximal hip and LE with self care and ADLs  [x] (19342) Gait Re-education- Provided training and instruction to the patient for proper LE, core and proximal hip recruitment and positioning and eccentric body

## 2023-03-24 ENCOUNTER — HOSPITAL ENCOUNTER (OUTPATIENT)
Dept: PHYSICAL THERAPY | Age: 28
Setting detail: THERAPIES SERIES
Discharge: HOME | End: 2023-03-24
Payer: COMMERCIAL

## 2023-03-24 ENCOUNTER — OFFICE VISIT (OUTPATIENT)
Dept: ORTHOPEDIC SURGERY | Age: 28
End: 2023-03-24

## 2023-03-24 VITALS — WEIGHT: 220 LBS | HEIGHT: 75 IN | BODY MASS INDEX: 27.35 KG/M2

## 2023-03-24 DIAGNOSIS — M76.32 IT BAND SYNDROME, LEFT: ICD-10-CM

## 2023-03-24 DIAGNOSIS — Z98.890 S/P LATERAL MENISCECTOMY OF LEFT KNEE: Primary | ICD-10-CM

## 2023-03-24 PROCEDURE — 99024 POSTOP FOLLOW-UP VISIT: CPT | Performed by: ORTHOPAEDIC SURGERY

## 2023-03-24 PROCEDURE — 97110 THERAPEUTIC EXERCISES: CPT

## 2023-03-24 PROCEDURE — 97530 THERAPEUTIC ACTIVITIES: CPT

## 2023-03-24 PROCEDURE — 97112 NEUROMUSCULAR REEDUCATION: CPT

## 2023-03-24 NOTE — PROGRESS NOTES
The [address] [place], [place] [address], [address], [address] [place] [address]  Phone: [phone]   Fax:     [phone]    Physical Therapy Daily Treatment Note  Date:  3/24/2023    Patient Name:  Shila Trinh    :  1995  MRN: [id number]  Restrictions/Precautions:    Medical/Treatment Diagnosis Information:  Diagnosis: W08.777M (ICD-10-CM) - Tear of lateral meniscus of left knee, unspecified tear type, unspecified whether old or current tear, initial encounter; Z98.890 (ICD-10-CM) - S/P lateral meniscectomy of left knee  Treatment Diagnosis: M25.562 Left Knee Pain  Insurance/Certification information:  PT Insurance Information: [facility]  Physician Information:   Breanna Keller MD  Has the plan of care been signed (Y/N):        []  Yes  [x]  No     Date of Patient follow up with Physician:       Is this a Progress Report:     []  Yes  [x]  No        If Yes:  Date Range for reporting period:  Beginning 23  Ending23    Progress report will be due (10 Rx or 30 days whichever is less): 28       Recertification will be due (POC Duration  / 90 days whichever is less): 23         Visit # Insurance Allowable Auth Required   8  3/24 60 []  Yes []  No        Functional Scale: LEFS: 30% deficit    Date assessed:  3/24       Latex Allergy:  [x]NO      []YES  Preferred Language for Healthcare:   [x]English       []other:    Pain level:  3-4/10   3/24     SUBJECTIVE:  3/24 Patient states that he is doing well. No complaints.     OBJECTIVE:   Flexibility  3/24 L R Comment   Hamstring Moderate restriction     Gastroc Mild restriction     ITB      Quad              ROM 3/24 PROM AROM Overpressure Comment    L R L R L R    Flexion   WNL WNL      Extension   0 0                              Strength  3/24 L R Comment   Quad 5     Hamstring 5     Gastroc 5     Hip  flexion 5     Hip abd 5                     Special Test   Results/Comment   Meniscal

## 2023-03-28 ENCOUNTER — HOSPITAL ENCOUNTER (OUTPATIENT)
Dept: PHYSICAL THERAPY | Age: 28
Setting detail: THERAPIES SERIES
Discharge: HOME | End: 2023-03-28
Payer: COMMERCIAL

## 2023-03-28 PROCEDURE — 97110 THERAPEUTIC EXERCISES: CPT

## 2023-03-28 PROCEDURE — 97112 NEUROMUSCULAR REEDUCATION: CPT

## 2023-03-28 PROCEDURE — 97530 THERAPEUTIC ACTIVITIES: CPT

## 2023-03-30 ENCOUNTER — HOSPITAL ENCOUNTER (OUTPATIENT)
Dept: PHYSICAL THERAPY | Age: 28
Setting detail: THERAPIES SERIES
Discharge: HOME | End: 2023-03-30
Payer: COMMERCIAL

## 2023-03-30 NOTE — FLOWSHEET NOTE
Physical Therapy  Cancellation/No-show Note  Patient Name:  Evelyn Felipe  :  1995   Date:  3/30/2023  Cancelled visits to date:   No-shows to date: 0    For today's appointment patient:  [x]  Cancelled  []  Rescheduled appointment  []  No-show     Reason given by patient:  []  Patient ill  []  Conflicting appointment  []  No transportation    []  Conflict with work  [x]  No reason given  []  Other:     Comments:      Electronically signed by:  Renny Beaulieu, PT, DPT

## 2023-04-05 ENCOUNTER — OFFICE VISIT (OUTPATIENT)
Dept: ORTHOPEDIC SURGERY | Age: 28
End: 2023-04-05

## 2023-04-05 ENCOUNTER — HOSPITAL ENCOUNTER (OUTPATIENT)
Dept: PHYSICAL THERAPY | Age: 28
Setting detail: THERAPIES SERIES
Discharge: HOME | End: 2023-04-05
Payer: COMMERCIAL

## 2023-04-05 VITALS — HEIGHT: 75 IN | WEIGHT: 220 LBS | BODY MASS INDEX: 27.35 KG/M2

## 2023-04-05 DIAGNOSIS — Z98.890 S/P LATERAL MENISCECTOMY OF LEFT KNEE: Primary | ICD-10-CM

## 2023-04-05 PROCEDURE — 99024 POSTOP FOLLOW-UP VISIT: CPT | Performed by: ORTHOPAEDIC SURGERY

## 2023-04-05 PROCEDURE — 97112 NEUROMUSCULAR REEDUCATION: CPT

## 2023-04-05 PROCEDURE — 97110 THERAPEUTIC EXERCISES: CPT

## 2023-04-05 PROCEDURE — 97530 THERAPEUTIC ACTIVITIES: CPT

## 2023-04-05 NOTE — PROGRESS NOTES
Chief Complaint  Follow-up (Left knee. S/p lateral menisectomy )      History of Present Illness:  Kristina Douglas is a pleasant 32 y.o. male presents for 1 month follow-up status post left knee partial lateral meniscectomy 2/23/2023. Patient endorses compliance with postoperative protocol and continues to do his postoperative rehab. Pain is well controlled. Denies new injuries or setbacks. Although his pain is well controlled he is asking about some clicking he is experiencing on the outside aspect of his left knee. Medical History:  Patient's medications, allergies, past medical, surgical, social and family histories were reviewed and updated as appropriate. Pertinent items are noted in HPI  Review of systems reviewed from Patient History Form dated on 4/5/2023   and available in the patient's chart under the Media tab. Vital Signs: There were no vitals filed for this visit. Neuro: Alert & oriented x 3,  normal,  no focal deficits noted. Normal affect. Eyes: sclera clear  Ears: Normal external ear  Mouth:  No perioral lesions  Pulm: Respirations unlabored and regular  Pulse: Regular rate and rhythm   Skin: Warm, well perfused      Constitutional: In no apparent distress. Normal affect. Alert and oriented X3 and is cooperative. Left knee exam    Gait: No use of assistive devices. No antalgic gait. Alignment: normal alignment. Inspection/skin: Skin is intact without erythema or ecchymosis. No gross deformity. Incisions well-healed without drainage or signs of infection    Palpation: no crepitus. no joint line tenderness present. There is painless snapping to the IT band laterally with flexion extension. Nontender to palpation at [facility] tubercle or over distal IT band    Range of Motion: There is full range of motion. Strength: Normal quadriceps development. Effusion: No effusion or swelling present.      Ligamentous stability: No cruciate or collateral ligament

## 2023-04-05 NOTE — FLOWSHEET NOTE
The [address] [place], [place] [address], [address], [address] [place] [address]  Phone: [phone]   Fax:     [phone]    Physical Therapy Daily Treatment Note  Date:  2023    Patient Name:  Alessandro Cameron    :  1995  MRN: [id number]  Restrictions/Precautions:    Medical/Treatment Diagnosis Information:  Diagnosis: S83.282A (ICD-10-CM) - Tear of lateral meniscus of left knee, unspecified tear type, unspecified whether old or current tear, initial encounter; Z98.890 (ICD-10-CM) - S/P lateral meniscectomy of left knee  Treatment Diagnosis: M25.562 Left Knee Pain  Insurance/Certification information:  PT Insurance Information: [facility]  Physician Information:   Libertad Guzman MD  Has the plan of care been signed (Y/N):        []  Yes  [x]  No     Date of Patient follow up with Physician:       Is this a Progress Report:     []  Yes  [x]  No        If Yes:  Date Range for reporting period:  Beginning 23  Ending23    Progress report will be due (10 Rx or 30 days whichever is less): 3/67/85       Recertification will be due (POC Duration  / 90 days whichever is less): 23         Visit # Insurance Allowable Auth Required   10   60 []  Yes []  No        Functional Scale: LEFS: 30% deficit    Date assessed:  3/24       Latex Allergy:  [x]NO      []YES  Preferred Language for Healthcare:   [x]English       []other:    Pain level:  0/10   4     SUBJECTIVE:   Patient states that he is doing pretty well. He states that he notices weakness still but otherwise doing well.     OBJECTIVE:   Flexibility  3/24 L R Comment   Hamstring Moderate restriction     Gastroc Mild restriction     ITB      Quad              ROM 3/24 PROM AROM Overpressure Comment    L R L R L R    Flexion   WNL WNL      Extension   0 0                              Strength  3/24 L R Comment   Quad 5     Hamstring 5     Gastroc 5     Hip  flexion 5     Hip abd 5

## 2023-04-14 ENCOUNTER — APPOINTMENT (OUTPATIENT)
Dept: PHYSICAL THERAPY | Age: 28
End: 2023-04-14
Payer: COMMERCIAL

## 2023-04-18 ENCOUNTER — HOSPITAL ENCOUNTER (OUTPATIENT)
Dept: PHYSICAL THERAPY | Age: 28
Setting detail: THERAPIES SERIES
Discharge: HOME | End: 2023-04-18
Payer: COMMERCIAL

## 2023-04-18 PROCEDURE — 97530 THERAPEUTIC ACTIVITIES: CPT

## 2023-04-18 PROCEDURE — 97110 THERAPEUTIC EXERCISES: CPT

## 2023-04-18 PROCEDURE — 97112 NEUROMUSCULAR REEDUCATION: CPT

## 2023-04-18 NOTE — FLOWSHEET NOTE
challenge. Continue to progress as tolerated. Plan to progress return to sport activities next session. Patient Education:                Access Code: [email or web address]  URL: [email or web address]  Date: 02/24/2023  Prepared by: Veronica Swann    PLAN: See eval  [x] Continue per plan of care [] Alter current plan (see comments above)  [] Plan of care initiated [] Hold pending MD visit [] Discharge      Electronically signed by:  Veronica Swann, PT, DPT    Note: If patient does not return for scheduled/ recommended follow up visits, this note will serve as a discharge from care along with most recent update on progress.

## 2023-04-28 ENCOUNTER — HOSPITAL ENCOUNTER (OUTPATIENT)
Dept: PHYSICAL THERAPY | Age: 28
Setting detail: THERAPIES SERIES
Discharge: HOME | End: 2023-04-28
Payer: COMMERCIAL

## 2023-05-02 ENCOUNTER — HOSPITAL ENCOUNTER (OUTPATIENT)
Dept: PHYSICAL THERAPY | Age: 28
Setting detail: THERAPIES SERIES
Discharge: HOME | End: 2023-05-02
Payer: COMMERCIAL

## 2023-05-02 PROCEDURE — 97110 THERAPEUTIC EXERCISES: CPT

## 2023-05-02 PROCEDURE — 97530 THERAPEUTIC ACTIVITIES: CPT

## 2023-05-02 PROCEDURE — 97112 NEUROMUSCULAR REEDUCATION: CPT

## 2023-05-02 NOTE — FLOWSHEET NOTE
The [address] [place] [address],[address], [address], [address] [place] [address]  Phone: [phone]   Fax:     [phone]    Physical Therapy Daily Treatment Note  Date:  2023    Patient Name:  Gary Chance    :  1995  MRN: [id number]  Restrictions/Precautions:    Medical/Treatment Diagnosis Information:  Diagnosis: T83.320V (ICD-10-CM) - Tear of lateral meniscus of left knee, unspecified tear type, unspecified whether old or current tear, initial encounter; Z98.890 (ICD-10-CM) - S/P lateral meniscectomy of left knee  Treatment Diagnosis: M25.562 Left Knee Pain  Insurance/Certification information:  PT Insurance Information: [facility]  Physician Information:   Leroy Rubio MD  Has the plan of care been signed (Y/N):        []  Yes  [x]  No     Date of Patient follow up with Physician:       Is this a Progress Report:     []  Yes  [x]  No        If Yes:  Date Range for reporting period:  Beginning 23  Ending23    Progress report will be due (10 Rx or 30 days whichever is less):       Recertification will be due (POC Duration  / 90 days whichever is less): 23         Visit # Insurance Allowable Auth Required   13   60 []  Yes []  No        Functional Scale: LEFS: 30% deficit    Date assessed:  3/24       Latex Allergy:  [x]NO      []YES  Preferred Language for Healthcare:   [x]English       []other:    Pain level:  0/10        SUBJECTIVE:   Patient states that he is doing well. He states that he has started to play a little basketball again and has been fine.     OBJECTIVE:   Flexibility  5/2 L R Comment   Hamstring Moderate restriction     Gastroc Mild restriction     ITB      Quad              ROM  PROM AROM Overpressure Comment    L R L R L R    Flexion   WNL WNL      Extension   0 0                              Strength  5/2 L R Comment   Quad 5 5    Hamstring 5 5    Gastroc 5 5    Hip  flexion 5 5    Hip abd 5 5

## 2023-05-09 ENCOUNTER — HOSPITAL ENCOUNTER (OUTPATIENT)
Dept: PHYSICAL THERAPY | Age: 28
Setting detail: THERAPIES SERIES
Discharge: HOME | End: 2023-05-09
Payer: COMMERCIAL

## 2023-05-09 NOTE — FLOWSHEET NOTE
Physical Therapy  Cancellation/No-show Note  Patient Name:  Pascale Parra  :  1995   Date:  2023  Cancelled visits to date: 03  No-shows to date: 0    For today's appointment patient:  [x]  Cancelled  []  Rescheduled appointment  []  No-show     Reason given by patient:  []  Patient ill  []  Conflicting appointment  []  No transportation    []  Conflict with work  [x]  No reason given  []  Other:     Comments:      Electronically signed by:  Bubba Lozano, PT, DPT

## 2023-05-11 ENCOUNTER — HOSPITAL ENCOUNTER (OUTPATIENT)
Dept: PHYSICAL THERAPY | Age: 28
Setting detail: THERAPIES SERIES
Discharge: HOME | End: 2023-05-11
Payer: COMMERCIAL

## 2023-05-11 NOTE — FLOWSHEET NOTE
Physical Therapy  Cancellation/No-show Note  Patient Name:  Martin Agudelo  :  1995   Date:  2023  Cancelled visits to date: 03  No-shows to date:     For today's appointment patient:  []  Cancelled  []  Rescheduled appointment  [x]  No-show     Reason given by patient:  []  Patient ill  []  Conflicting appointment  []  No transportation    []  Conflict with work  [x]  No reason given  []  Other:     Comments:      Electronically signed by:  Carlo Hay, PT, DPT

## 2023-06-02 ENCOUNTER — OFFICE VISIT (OUTPATIENT)
Dept: FAMILY MEDICINE CLINIC | Age: 28
End: 2023-06-02
Payer: COMMERCIAL

## 2023-06-02 VITALS
WEIGHT: 227 LBS | SYSTOLIC BLOOD PRESSURE: 119 MMHG | DIASTOLIC BLOOD PRESSURE: 67 MMHG | BODY MASS INDEX: 28.23 KG/M2 | HEART RATE: 75 BPM | TEMPERATURE: 98.2 F | HEIGHT: 75 IN | OXYGEN SATURATION: 98 %

## 2023-06-02 DIAGNOSIS — H91.91 DECREASED HEARING OF RIGHT EAR: ICD-10-CM

## 2023-06-02 DIAGNOSIS — H66.91 ACUTE RIGHT OTITIS MEDIA: Primary | ICD-10-CM

## 2023-06-02 PROCEDURE — 99214 OFFICE O/P EST MOD 30 MIN: CPT | Performed by: NURSE PRACTITIONER

## 2023-06-02 RX ORDER — FLUTICASONE PROPIONATE 50 MCG
1-2 SPRAY, SUSPENSION (ML) NASAL DAILY
Qty: 1 EACH | Refills: 1 | Status: SHIPPED | OUTPATIENT
Start: 2023-06-02

## 2023-06-02 RX ORDER — NEOMYCIN SULFATE, POLYMYXIN B SULFATE AND HYDROCORTISONE 10; 3.5; 1 MG/ML; MG/ML; [USP'U]/ML
SUSPENSION/ DROPS AURICULAR (OTIC)
COMMUNITY
Start: 2023-05-23 | End: 2023-06-02

## 2023-06-02 RX ORDER — CEFDINIR 300 MG/1
300 CAPSULE ORAL 2 TIMES DAILY
Qty: 20 CAPSULE | Refills: 0 | Status: SHIPPED | OUTPATIENT
Start: 2023-06-02 | End: 2023-06-12

## 2023-06-02 RX ORDER — AMOXICILLIN 500 MG/1
500 TABLET, FILM COATED ORAL EVERY 8 HOURS
COMMUNITY
Start: 2023-05-23 | End: 2023-06-02

## 2023-06-02 NOTE — PROGRESS NOTES
Sharon Ross (:  1995) is a 32 y.o. male,Established patient, here for evaluation of the following chief complaint(s):  Ear Fullness (Right ear. Pain and drainage is gone but still does not have full hearing back. Wanted to know if he should get more ear drops because he just finished them but he still has a couple days left of the antibiotic.)       ASSESSMENT/PLAN:  1. Acute right otitis media  Stable; Not progressing; Stop Amox and begin cefdinir. -     cefdinir (OMNICEF) 300 MG capsule; Take 1 capsule by mouth 2 times daily for 10 days, Disp-20 capsule, R-0Normal  2. Decreased hearing of right ear  Stable; Not progressing;  Begin flonase daily. -     fluticasone (FLONASE) 50 MCG/ACT nasal spray; 1-2 sprays by Each Nostril route daily, Disp-1 each, R-1Normal    Return if symptoms worsen or fail to improve. Subjective   SUBJECTIVE/OBJECTIVE:  HPI  Went to  on - dx with OE and OM right  Amoxicillin- has taken 1-2x/day and cortisporin- finished  Had a very bad pain at first 8/10- couldn't sleep  Has hard of hearing- now crackles when sneezes  When swallowed or burped- pain initiated  No longer having ear drainage  Pain has been gone x a few days  No runny nose or congestion  No fevers  Has been in water    Review of Systems       Objective   Physical Exam  Vitals reviewed. Constitutional:       Appearance: Normal appearance. HENT:      Head: Normocephalic. Right Ear: Ear canal and external ear normal. Tympanic membrane is erythematous. Left Ear: Tympanic membrane, ear canal and external ear normal.      Nose: Nose normal.      Mouth/Throat:      Lips: Pink. Mouth: Mucous membranes are moist.      Pharynx: Oropharynx is clear. Uvula midline. Cardiovascular:      Rate and Rhythm: Normal rate and regular rhythm. Pulses: Normal pulses.       Heart sounds: Normal heart sounds, S1 normal and S2 normal.   Pulmonary:      Effort: Pulmonary effort is normal.      Breath

## 2023-06-05 RX ORDER — FLUTICASONE PROPIONATE 50 MCG
SPRAY, SUSPENSION (ML) NASAL
Qty: 48 G | OUTPATIENT
Start: 2023-06-05

## 2023-07-20 ENCOUNTER — OFFICE VISIT (OUTPATIENT)
Dept: FAMILY MEDICINE CLINIC | Age: 28
End: 2023-07-20
Payer: COMMERCIAL

## 2023-07-20 VITALS
WEIGHT: 225 LBS | BODY MASS INDEX: 27.98 KG/M2 | DIASTOLIC BLOOD PRESSURE: 70 MMHG | HEIGHT: 75 IN | HEART RATE: 70 BPM | SYSTOLIC BLOOD PRESSURE: 122 MMHG | OXYGEN SATURATION: 94 %

## 2023-07-20 DIAGNOSIS — Z11.3 SCREENING FOR STD (SEXUALLY TRANSMITTED DISEASE): ICD-10-CM

## 2023-07-20 DIAGNOSIS — H91.91 DECREASED HEARING OF RIGHT EAR: ICD-10-CM

## 2023-07-20 DIAGNOSIS — Z11.9 SCREENING EXAMINATION FOR INFECTIOUS DISEASE: ICD-10-CM

## 2023-07-20 DIAGNOSIS — H91.91 DECREASED HEARING OF RIGHT EAR: Primary | ICD-10-CM

## 2023-07-20 LAB — HCV AB SERPL QL IA: NORMAL

## 2023-07-20 PROCEDURE — 99214 OFFICE O/P EST MOD 30 MIN: CPT | Performed by: NURSE PRACTITIONER

## 2023-07-20 RX ORDER — FLUTICASONE PROPIONATE 50 MCG
1-2 SPRAY, SUSPENSION (ML) NASAL DAILY
Qty: 1 EACH | Refills: 1 | Status: SHIPPED | OUTPATIENT
Start: 2023-07-20 | End: 2023-07-21

## 2023-07-21 LAB
C TRACH DNA UR QL NAA+PROBE: NEGATIVE
HIV 1+2 AB+HIV1 P24 AG SERPL QL IA: NORMAL
HIV 2 AB SERPL QL IA: NORMAL
HIV1 AB SERPL QL IA: NORMAL
HIV1 P24 AG SERPL QL IA: NORMAL
N GONORRHOEA DNA UR QL NAA+PROBE: NEGATIVE
REAGIN+T PALLIDUM IGG+IGM SERPL-IMP: NORMAL
SPECIMEN TYPE: NORMAL
TRICHOMONAS VAGINALIS SCREEN: NEGATIVE

## 2023-07-21 RX ORDER — FLUTICASONE PROPIONATE 50 MCG
SPRAY, SUSPENSION (ML) NASAL
Qty: 48 G | Refills: 0 | Status: SHIPPED | OUTPATIENT
Start: 2023-07-21

## 2023-12-05 ENCOUNTER — OFFICE VISIT (OUTPATIENT)
Dept: FAMILY MEDICINE CLINIC | Age: 28
End: 2023-12-05
Payer: COMMERCIAL

## 2023-12-05 VITALS
HEART RATE: 95 BPM | SYSTOLIC BLOOD PRESSURE: 118 MMHG | TEMPERATURE: 97 F | DIASTOLIC BLOOD PRESSURE: 68 MMHG | WEIGHT: 217 LBS | OXYGEN SATURATION: 99 % | HEIGHT: 75 IN | BODY MASS INDEX: 26.98 KG/M2

## 2023-12-05 DIAGNOSIS — R05.9 COUGH IN ADULT PATIENT: ICD-10-CM

## 2023-12-05 DIAGNOSIS — R09.81 NASAL CONGESTION: Primary | ICD-10-CM

## 2023-12-05 PROCEDURE — 99214 OFFICE O/P EST MOD 30 MIN: CPT | Performed by: NURSE PRACTITIONER

## 2023-12-05 RX ORDER — FLUTICASONE PROPIONATE 50 MCG
SPRAY, SUSPENSION (ML) NASAL
Qty: 48 G | Refills: 0 | Status: SHIPPED | OUTPATIENT
Start: 2023-12-05

## 2023-12-05 RX ORDER — ALBUTEROL SULFATE 90 UG/1
2 AEROSOL, METERED RESPIRATORY (INHALATION) EVERY 6 HOURS PRN
Qty: 54 G | Refills: 1 | Status: SHIPPED | OUTPATIENT
Start: 2023-12-05

## 2023-12-05 RX ORDER — BENZONATATE 100 MG/1
100 CAPSULE ORAL 3 TIMES DAILY PRN
Qty: 30 CAPSULE | Refills: 0 | Status: SHIPPED | OUTPATIENT
Start: 2023-12-05 | End: 2023-12-15

## 2023-12-05 RX ORDER — AZITHROMYCIN 250 MG/1
250 TABLET, FILM COATED ORAL SEE ADMIN INSTRUCTIONS
Qty: 6 TABLET | Refills: 0 | Status: SHIPPED | OUTPATIENT
Start: 2023-12-05 | End: 2023-12-10

## 2023-12-05 NOTE — PROGRESS NOTES
Kenisha Chamberlain (:  1995) is a 25 y.o. male,Established patient, here for evaluation of the following chief complaint(s):  Congestion, Cough (Pt states this has been going on for about 4 days ), and Fever       ASSESSMENT/PLAN:  1. Nasal congestion  Not progressing; Restart flonase. Discussed humidifier, steam and fluids. COVID swab completed. 2. Cough in adult patient  Not progressing; Discussed likely viral bronchitis. Begin albuterol and tessalon perles. COVID swab completed. Patient requesting abx- begin zithromax if worsening.  -     albuterol sulfate HFA (VENTOLIN HFA) 108 (90 Base) MCG/ACT inhaler; Inhale 2 puffs into the lungs every 6 hours as needed (cough), Disp-54 g, R-1Normal  -     benzonatate (TESSALON) 100 MG capsule; Take 1 capsule by mouth 3 times daily as needed for Cough, Disp-30 capsule, R-0Normal  -     COVID-19  -     azithromycin (ZITHROMAX) 250 MG tablet; Take 1 tablet by mouth See Admin Instructions for 5 days 500mg on day 1 followed by 250mg on days 2 - 5, Disp-6 tablet, R-0Normal      Return if symptoms worsen or fail to improve. Subjective   SUBJECTIVE/OBJECTIVE:  HPI  X 5-6 days- today isn't the worst day  Congestion  No runny nose  Sneezing  Cough- nonproductive; has been coughing a lot- coughing fits  Some chest tightening  No wheezing  No chest pain  No shortness of breath  Fever- a couple days ago  Myalgias, chills, fatigue- fells better  No headaches  No sinus pressure  Theraflu, nyquil, dayquil, Tylenol    Review of Systems       Objective   Physical Exam  Vitals reviewed. Constitutional:       Appearance: Normal appearance. HENT:      Head: Normocephalic. Right Ear: Tympanic membrane, ear canal and external ear normal.      Left Ear: Tympanic membrane, ear canal and external ear normal.      Nose: Congestion present. Right Sinus: No maxillary sinus tenderness or frontal sinus tenderness.       Left Sinus: No maxillary sinus tenderness or

## 2023-12-06 LAB — SARS-COV-2 N GENE RESP QL NAA+PROBE: NOT DETECTED

## 2024-03-14 ENCOUNTER — OFFICE VISIT (OUTPATIENT)
Dept: FAMILY MEDICINE CLINIC | Age: 29
End: 2024-03-14
Payer: COMMERCIAL

## 2024-03-14 VITALS
WEIGHT: 218 LBS | SYSTOLIC BLOOD PRESSURE: 120 MMHG | BODY MASS INDEX: 27.1 KG/M2 | HEIGHT: 75 IN | DIASTOLIC BLOOD PRESSURE: 70 MMHG | HEART RATE: 61 BPM | OXYGEN SATURATION: 98 %

## 2024-03-14 DIAGNOSIS — J02.9 SORE THROAT: ICD-10-CM

## 2024-03-14 DIAGNOSIS — Z00.00 WELL ADULT EXAM: Primary | ICD-10-CM

## 2024-03-14 PROCEDURE — 99395 PREV VISIT EST AGE 18-39: CPT | Performed by: NURSE PRACTITIONER

## 2024-03-14 RX ORDER — FLUTICASONE PROPIONATE 50 MCG
SPRAY, SUSPENSION (ML) NASAL
Qty: 48 G | Refills: 0 | Status: SHIPPED | OUTPATIENT
Start: 2024-03-14

## 2024-03-14 SDOH — ECONOMIC STABILITY: INCOME INSECURITY: HOW HARD IS IT FOR YOU TO PAY FOR THE VERY BASICS LIKE FOOD, HOUSING, MEDICAL CARE, AND HEATING?: NOT HARD AT ALL

## 2024-03-14 SDOH — ECONOMIC STABILITY: FOOD INSECURITY: WITHIN THE PAST 12 MONTHS, YOU WORRIED THAT YOUR FOOD WOULD RUN OUT BEFORE YOU GOT MONEY TO BUY MORE.: NEVER TRUE

## 2024-03-14 SDOH — ECONOMIC STABILITY: FOOD INSECURITY: WITHIN THE PAST 12 MONTHS, THE FOOD YOU BOUGHT JUST DIDN'T LAST AND YOU DIDN'T HAVE MONEY TO GET MORE.: NEVER TRUE

## 2024-03-14 ASSESSMENT — PATIENT HEALTH QUESTIONNAIRE - PHQ9
SUM OF ALL RESPONSES TO PHQ QUESTIONS 1-9: 0
2. FEELING DOWN, DEPRESSED OR HOPELESS: 0
1. LITTLE INTEREST OR PLEASURE IN DOING THINGS: 0
SUM OF ALL RESPONSES TO PHQ QUESTIONS 1-9: 0
SUM OF ALL RESPONSES TO PHQ9 QUESTIONS 1 & 2: 0
SUM OF ALL RESPONSES TO PHQ QUESTIONS 1-9: 0

## 2024-03-14 NOTE — PROGRESS NOTES
[facility]  [facility]  [address] [place] [address]  Phone: [phone]    3/14/2024    Fady Angela (:  1995) is a 28 y.o. male, here for a preventive medicine evaluation.    There is no problem list on file for this patient.    Sore throat  X 1 week or so  Trouble swallowing  Throat feels dry  Runny nose, congestion  No headaches  Chest congestion  Cough- dry  No fevers, chills or body aches  Went to  on Tuesday- Rapid strep: negative- flonase, prednisone, Amox/clavu    Diet: 1-2 meals/day; picky eater; eats out 3-4x/week    Exercise: goes the gym- basketball, cycling, yoga, HIT    Occasional alcohol use- 1x/month  Occasional tobacco  MJ use- not consistent    Review of Systems    Prior to Visit Medications    Medication Sig Taking? Authorizing Provider   fluticasone (FLONASE) 50 MCG/ACT nasal spray SHAKE LIQUID AND USE 1 TO 2 SPRAYS IN EACH NOSTRIL DAILY Yes Patricia Hoskins APRN - CNP   albuterol sulfate HFA (VENTOLIN HFA) 108 (90 Base) MCG/ACT inhaler Inhale 2 puffs into the lungs every 6 hours as needed (cough)  Patient not taking: Reported on 3/14/2024  Patricia Hoskins APRN - CNP        No Known Allergies    Past Medical History:   Diagnosis Date    Rhabdomyolysis     10-12 years ago       Past Surgical History:   Procedure Laterality Date    KNEE ARTHROSCOPY Left 2023    LEFT KNEE ARTHROSCOPY, PARTIAL LATERAL MENISCECTOMY performed by Joao Vizcarra MD at [facility] OR       Social History     Socioeconomic History    Marital status: Single     Spouse name: Not on file    Number of children: 0    Years of education: Not on file    Highest education level: Not on file   Occupational History    Not on file   Tobacco Use    Smoking status: Never     Passive exposure: Never    Smokeless tobacco: Never   Vaping Use    Vaping Use: Never used   Substance and Sexual Activity    Alcohol use: Yes     Comment: SOCIALLY    Drug use: Yes

## 2024-04-24 ENCOUNTER — OFFICE VISIT (OUTPATIENT)
Dept: ORTHOPEDIC SURGERY | Age: 29
End: 2024-04-24
Payer: COMMERCIAL

## 2024-04-24 VITALS — BODY MASS INDEX: 26.11 KG/M2 | HEIGHT: 75 IN | WEIGHT: 210 LBS

## 2024-04-24 DIAGNOSIS — M25.562 LEFT KNEE PAIN, UNSPECIFIED CHRONICITY: Primary | ICD-10-CM

## 2024-04-24 DIAGNOSIS — M23.92 PATELLAR MALALIGNMENT SYNDROME OF LEFT KNEE: ICD-10-CM

## 2024-04-24 PROCEDURE — 99214 OFFICE O/P EST MOD 30 MIN: CPT | Performed by: ORTHOPAEDIC SURGERY

## 2024-04-24 NOTE — PROGRESS NOTES
medication, antiinflammatories, injections, bracing, physical therapy, and surgical interventions were all described to the patient and questions were answered.    Patient experienced an exacerbation of pain and swelling following playing basketball a couple months ago, no specific injury. He is a over a year out from a partial lateral menisectomy. On exam, he does not have any lateral joint line tenderness and no swelling, just some crepitus. Low concern for any new tears or structural issue, likely scar tissue. Recommend continuing his post operative exercises for flexibility and strengthening and he is able to use ibuprofen as needed for pain and inflammation. We will give him a referral to formal, supervised physical therapy if he wishes to return for a patellar malalignment program.    I will see him back in 1 month, sooner if symptoms worsen. Fady Angela is in agreement with this plan. All questions were answered to patient's satisfaction and was encouraged to call with any further questions.      I spent 30 minutes providing this service today, to include the time spent seeing the patient, documenting, reviewing chart, developing and communicating a treatment plan, excluding any separately billed procedures.        I, Sandy Pennington ATC, am scribing for and in the presence of Dr. Joao Vizcarra.   04/24/24 6:09 PM Sandy Pennington ATC    I attest that I met personally with the patient, performed the described exam, reviewed the radiographic studies and medical records associated with this patient and supervised the services that are described above.     Joao Vizcarra MD

## 2024-10-25 ENCOUNTER — OFFICE VISIT (OUTPATIENT)
Dept: FAMILY MEDICINE CLINIC | Age: 29
End: 2024-10-25
Payer: COMMERCIAL

## 2024-10-25 VITALS
DIASTOLIC BLOOD PRESSURE: 64 MMHG | SYSTOLIC BLOOD PRESSURE: 112 MMHG | BODY MASS INDEX: 25.24 KG/M2 | HEIGHT: 75 IN | WEIGHT: 203 LBS | OXYGEN SATURATION: 97 % | HEART RATE: 72 BPM

## 2024-10-25 DIAGNOSIS — Z00.00 WELL ADULT EXAM: Primary | ICD-10-CM

## 2024-10-25 DIAGNOSIS — Z11.3 SCREENING FOR STD (SEXUALLY TRANSMITTED DISEASE): ICD-10-CM

## 2024-10-25 DIAGNOSIS — R14.2 BURPING: ICD-10-CM

## 2024-10-25 DIAGNOSIS — B35.3 TINEA PEDIS OF RIGHT FOOT: ICD-10-CM

## 2024-10-25 DIAGNOSIS — Z11.9 SCREENING EXAMINATION FOR INFECTIOUS DISEASE: ICD-10-CM

## 2024-10-25 PROCEDURE — 99395 PREV VISIT EST AGE 18-39: CPT | Performed by: NURSE PRACTITIONER

## 2024-10-25 RX ORDER — CLOTRIMAZOLE 1 %
CREAM (GRAM) TOPICAL
Qty: 45 G | Refills: 1 | Status: SHIPPED | OUTPATIENT
Start: 2024-10-25 | End: 2024-11-01

## 2024-10-25 NOTE — PROGRESS NOTES
[facility]  [facility]  [address] [place] [address]  Phone: [phone]    10/25/2024    Fady Angela (:  1995) is a 29 y.o. male, here for a preventive medicine evaluation.    There is no problem list on file for this patient.    Excessive burping  After eating  Recreational MJ use  No epigastric pain  A little discomfort in his throat- not painful  No dysphagia- has happened in the past  No GERD  Had regurgitation x 1    Dead skin in between toes  Is not itchy  Is between multiple toes- right foot  Would put cream on it- aquaphor, cocoa butter- feels a little smoother    Review of Systems    Prior to Visit Medications    Medication Sig Taking? Authorizing Provider   omeprazole (PRILOSEC) 20 MG delayed release capsule Take 1 capsule by mouth every morning (before breakfast) Yes Patricia Hoskins APRN - CNP   clotrimazole (LOTRIMIN AF) 1 % cream Apply topically 2 times daily. Yes Patricia Hoskins APRN - CNP        No Known Allergies    Past Medical History:   Diagnosis Date    Rhabdomyolysis     10-12 years ago       Past Surgical History:   Procedure Laterality Date    KNEE ARTHROSCOPY Left 2023    LEFT KNEE ARTHROSCOPY, PARTIAL LATERAL MENISCECTOMY performed by Joao Vizcarra MD at [facility] OR       Social History     Socioeconomic History    Marital status: Single     Spouse name: Not on file    Number of children: 0    Years of education: Not on file    Highest education level: Not on file   Occupational History    Not on file   Tobacco Use    Smoking status: Never     Passive exposure: Never    Smokeless tobacco: Never   Vaping Use    Vaping status: Never Used   Substance and Sexual Activity    Alcohol use: Yes     Comment: SOCIALLY    Drug use: Yes     Types: Marijuana (Weed)     Comment: COUPLE TIMES A MONTH    Sexual activity: Not on file   Other Topics Concern    Not on file   Social History Narrative    Not on file     Social

## 2024-10-28 LAB
C TRACH DNA UR QL NAA+PROBE: NEGATIVE
N GONORRHOEA DNA UR QL NAA+PROBE: NEGATIVE

## 2024-10-28 NOTE — TELEPHONE ENCOUNTER
Medication:   Requested Prescriptions     Pending Prescriptions Disp Refills    omeprazole (PRILOSEC) 20 MG delayed release capsule [Pharmacy Med Name: OMEPRAZOLE 20MG CAPSULES] 90 capsule      Sig: TAKE 1 CAPSULE BY MOUTH EVERY MORNING BEFORE BREAKFAST       Last Filled:  10/25/24    Patient Phone Number: [phone] (home)     Last appt: 10/25/2024   Next appt: Visit date not found    Last Labs DM:   Lab Results   Component Value Date/Time    LABA1C 5.5 02/09/2023 09:16 AM     Last Lipid:   Lab Results   Component Value Date/Time    CHOL 144 02/09/2023 09:16 AM    TRIG 63 02/09/2023 09:16 AM    HDL 49 02/09/2023 09:16 AM     Last PSA: No results found for: \"PSA\"  Last Thyroid: No results found for: \"TSH\", \"FT3\", \"I4WFBSK\", \"T4FREE\"

## 2024-12-11 ENCOUNTER — OFFICE VISIT (OUTPATIENT)
Dept: ORTHOPEDIC SURGERY | Age: 29
End: 2024-12-11
Payer: COMMERCIAL

## 2024-12-11 VITALS — HEIGHT: 75 IN | BODY MASS INDEX: 24.87 KG/M2 | WEIGHT: 200 LBS

## 2024-12-11 DIAGNOSIS — S89.92XA INJURY OF LEFT KNEE, INITIAL ENCOUNTER: ICD-10-CM

## 2024-12-11 DIAGNOSIS — M25.562 LEFT KNEE PAIN, UNSPECIFIED CHRONICITY: Primary | ICD-10-CM

## 2024-12-11 PROCEDURE — 99214 OFFICE O/P EST MOD 30 MIN: CPT | Performed by: ORTHOPAEDIC SURGERY

## 2024-12-11 RX ORDER — DICLOFENAC SODIUM 75 MG/1
75 TABLET, DELAYED RELEASE ORAL 2 TIMES DAILY
Qty: 60 TABLET | Refills: 2 | Status: SHIPPED | OUTPATIENT
Start: 2024-12-11

## 2024-12-11 NOTE — PROGRESS NOTES
Chief Complaint  Follow-up (Left knee. )      History of Present Illness:  Fady Angela is a pleasant 29 y.o. male who presents today for follow up evaluation of left knee. 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 This resolved with over the counter anti-inflammatories and physical therapy. He returns today with an exacerbation of symptoms. He states he was playing basketball on Monday and felt a pop in the lateral side of his left knee with associated pain. He endorses a catching and popping in the left knee as well as swelling. Pain is worse with twisting, turning and bending.    Medical History:  Patient's medications, allergies, past medical, surgical, social and family histories were reviewed and updated as appropriate.    Pertinent items are noted in HPI  Review of systems reviewed from Patient History Form completed today and available in the patient's chart under the Media tab.         Pain Assessment  Location of Pain: Knee  Location Modifiers: Left  Severity of Pain: 6  Quality of Pain: Sharp  Duration of Pain: Persistent  Frequency of Pain: Intermittent  Limiting Behavior: Some  Relieving Factors: Rest  Result of Injury: No  Work-Related Injury: No  Are there other pain locations you wish to document?: No    Past Medical History:   Diagnosis Date    Rhabdomyolysis     10-12 years ago        Past Surgical History:   Procedure Laterality Date    KNEE ARTHROSCOPY Left 2/23/2023    LEFT KNEE ARTHROSCOPY, PARTIAL LATERAL MENISCECTOMY performed by Joao Vizcarra MD at [facility] OR       History reviewed. No pertinent family history.    Social History     Socioeconomic History    Marital status: Single     Spouse name: None    Number of children: 0    Years of education: None    Highest education level: None   Tobacco Use    Smoking status: Never     Passive exposure: Never    Smokeless tobacco: Never   Vaping Use    Vaping

## 2025-02-20 ENCOUNTER — PATIENT MESSAGE (OUTPATIENT)
Dept: FAMILY MEDICINE CLINIC | Age: 30
End: 2025-02-20

## 2025-02-23 DIAGNOSIS — Z11.9 SCREENING EXAMINATION FOR INFECTIOUS DISEASE: Primary | ICD-10-CM

## 2025-08-07 ENCOUNTER — OFFICE VISIT (OUTPATIENT)
Dept: FAMILY MEDICINE CLINIC | Age: 30
End: 2025-08-07
Payer: COMMERCIAL

## 2025-08-07 VITALS
BODY MASS INDEX: 25.37 KG/M2 | SYSTOLIC BLOOD PRESSURE: 119 MMHG | HEART RATE: 62 BPM | WEIGHT: 203 LBS | OXYGEN SATURATION: 99 % | DIASTOLIC BLOOD PRESSURE: 75 MMHG

## 2025-08-07 DIAGNOSIS — R13.10 DYSPHAGIA, UNSPECIFIED TYPE: Primary | ICD-10-CM

## 2025-08-07 DIAGNOSIS — R14.2 BURPING: ICD-10-CM

## 2025-08-07 PROCEDURE — 99213 OFFICE O/P EST LOW 20 MIN: CPT | Performed by: NURSE PRACTITIONER

## 2025-08-07 RX ORDER — OMEPRAZOLE 20 MG/1
20 CAPSULE, DELAYED RELEASE ORAL
Qty: 90 CAPSULE | Refills: 0 | Status: SHIPPED | OUTPATIENT
Start: 2025-08-07

## 2025-08-07 SDOH — ECONOMIC STABILITY: FOOD INSECURITY: WITHIN THE PAST 12 MONTHS, YOU WORRIED THAT YOUR FOOD WOULD RUN OUT BEFORE YOU GOT MONEY TO BUY MORE.: NEVER TRUE

## 2025-08-07 SDOH — ECONOMIC STABILITY: FOOD INSECURITY: WITHIN THE PAST 12 MONTHS, THE FOOD YOU BOUGHT JUST DIDN'T LAST AND YOU DIDN'T HAVE MONEY TO GET MORE.: NEVER TRUE

## 2025-08-07 ASSESSMENT — PATIENT HEALTH QUESTIONNAIRE - PHQ9
SUM OF ALL RESPONSES TO PHQ QUESTIONS 1-9: 0
SUM OF ALL RESPONSES TO PHQ QUESTIONS 1-9: 0
1. LITTLE INTEREST OR PLEASURE IN DOING THINGS: NOT AT ALL
2. FEELING DOWN, DEPRESSED OR HOPELESS: NOT AT ALL
SUM OF ALL RESPONSES TO PHQ QUESTIONS 1-9: 0
SUM OF ALL RESPONSES TO PHQ QUESTIONS 1-9: 0

## (undated) DEVICE — ADHESIVE SKIN CLSR 0.7ML TOP DERMBND ADV

## (undated) DEVICE — TOWEL,STOP FLAG GOLD N-W: Brand: MEDLINE

## (undated) DEVICE — COVER,MAYO STAND,XL,STERILE: Brand: MEDLINE

## (undated) DEVICE — TUBING FLD MGMT Y DBL SPIK DUALWAVE

## (undated) DEVICE — PAD,NON-ADHERENT,3X8,STERILE,LF,1/PK: Brand: MEDLINE

## (undated) DEVICE — KNEE ARTHROSCOPY: Brand: MEDLINE INDUSTRIES, INC.

## (undated) DEVICE — BLADE SHV L13CM DIA4MM TAPR TIP SCIS LIKE CUT OVL OUTER

## (undated) DEVICE — SUTURE MCRYL SZ 4-0 L27IN ABSRB UD L19MM PS-2 1/2 CIR PRIM Y426H

## (undated) DEVICE — GLOVE SURG SZ 9 L12IN FNGR THK79MIL GRN LTX FREE

## (undated) DEVICE — GOWN,SIRUS,POLYRNF,BRTHSLV,XL,30/CS: Brand: MEDLINE

## (undated) DEVICE — GLOVE SURG SZ 9 L1185IN FNGR THK75MIL STRW LTX POLYMER BEAD

## (undated) DEVICE — GOWN,SIRUS,POLYRNF,BRTHSLV,XLN/XXL,18/CS: Brand: MEDLINE

## (undated) DEVICE — MAT FLR W32XL58IN

## (undated) DEVICE — TUBING PMP L16FT MAIN DISP FOR AR-6400 AR-6475

## (undated) DEVICE — TUBING PMP L6FT CONT WAVE EXTN

## (undated) DEVICE — SOLUTION IRRIG 3000ML LAC R FLX CONT